# Patient Record
Sex: FEMALE | Race: WHITE | NOT HISPANIC OR LATINO | Employment: FULL TIME | URBAN - METROPOLITAN AREA
[De-identification: names, ages, dates, MRNs, and addresses within clinical notes are randomized per-mention and may not be internally consistent; named-entity substitution may affect disease eponyms.]

---

## 2021-07-02 ENCOUNTER — APPOINTMENT (OUTPATIENT)
Dept: RADIOLOGY | Facility: CLINIC | Age: 51
End: 2021-07-02
Payer: COMMERCIAL

## 2021-07-02 ENCOUNTER — OFFICE VISIT (OUTPATIENT)
Dept: URGENT CARE | Facility: CLINIC | Age: 51
End: 2021-07-02
Payer: COMMERCIAL

## 2021-07-02 VITALS
HEART RATE: 84 BPM | SYSTOLIC BLOOD PRESSURE: 139 MMHG | RESPIRATION RATE: 18 BRPM | DIASTOLIC BLOOD PRESSURE: 87 MMHG | OXYGEN SATURATION: 99 % | TEMPERATURE: 97.9 F

## 2021-07-02 DIAGNOSIS — S93.401A MILD ANKLE SPRAIN, RIGHT, INITIAL ENCOUNTER: ICD-10-CM

## 2021-07-02 DIAGNOSIS — M79.671 RIGHT FOOT PAIN: ICD-10-CM

## 2021-07-02 DIAGNOSIS — M25.571 ACUTE RIGHT ANKLE PAIN: Primary | ICD-10-CM

## 2021-07-02 DIAGNOSIS — M25.571 ACUTE RIGHT ANKLE PAIN: ICD-10-CM

## 2021-07-02 PROCEDURE — 73630 X-RAY EXAM OF FOOT: CPT

## 2021-07-02 PROCEDURE — 99203 OFFICE O/P NEW LOW 30 MIN: CPT | Performed by: PHYSICIAN ASSISTANT

## 2021-07-02 PROCEDURE — 73610 X-RAY EXAM OF ANKLE: CPT

## 2021-07-02 NOTE — PROGRESS NOTES
800 11           NAME: Beba Hutchison is a 45 y o  female  : 1970    MRN: [id number]  DATE: 2021  TIME: 1:52 PM    Assessment and Plan   Acute right ankle pain [M25 571]  1  Acute right ankle pain  XR ankle 3+ vw right   2  Right foot pain  XR foot 3+ vw right   3  Mild ankle sprain, right, initial encounter         Patient Instructions   Boot as provided for comfort  Rest and ice as able  OTC IBU Q 4-6 hours prn for pain  Follow up with PCP/ortho in next 3-5 days  Patient agreeable to plan  To present to the ER if symptoms worsen  Chief Complaint     Chief Complaint   Patient presents with    Ankle Pain     Right ankle and foot pain after falling last night  History of Present Illness   Beba Hutchison presents to the clinic c/o    NO hitting head or any LOC with fall  Ankle Pain   The incident occurred 6 to 12 hours ago  The incident occurred at home  The injury mechanism was a fall  The pain is present in the right ankle and right foot  The quality of the pain is described as aching  The pain is moderate  Associated symptoms include an inability to bear weight  Pertinent negatives include no muscle weakness or numbness  She reports no foreign bodies present  The symptoms are aggravated by movement, palpation and weight bearing  She has tried NSAIDs for the symptoms  The treatment provided mild relief  Review of Systems   Review of Systems   Constitutional: Negative for chills, diaphoresis, fatigue and fever  HENT: Negative for congestion, ear discharge, ear pain and facial swelling  Eyes: Negative for photophobia, pain, discharge, redness, itching and visual disturbance  Respiratory: Negative for apnea, cough, chest tightness, shortness of breath and wheezing  Cardiovascular: Negative for chest pain and palpitations  Gastrointestinal: Negative for abdominal pain  Musculoskeletal: Positive for arthralgias and gait problem     Skin: Negative for color change, rash and wound  Neurological: Negative for dizziness, numbness and headaches  Hematological: Negative for adenopathy  Current Medications     No long-term medications on file  Current Allergies     Allergies as of 07/02/2021    (No Known Allergies)            The following portions of the patient's history were reviewed and updated as appropriate: allergies, current medications, past family history, past medical history, past social history, past surgical history and problem list   No past medical history on file  No past surgical history on file  Social History     Socioeconomic History    Marital status: /Civil Union     Spouse name: Not on file    Number of children: Not on file    Years of education: Not on file    Highest education level: Not on file   Occupational History    Not on file   Tobacco Use    Smoking status: Not on file   Substance and Sexual Activity    Alcohol use: Not on file    Drug use: Not on file    Sexual activity: Not on file   Other Topics Concern    Not on file   Social History Narrative    Not on file     Social Determinants of Health     Financial Resource Strain:     Difficulty of Paying Living Expenses:    Food Insecurity:     Worried About Running Out of Food in the Last Year:     [address] in the Last Year:    Transportation Needs:     Lack of Transportation (Medical):      Lack of Transportation (Non-Medical):    Physical Activity:     Days of Exercise per Week:     Minutes of Exercise per Session:    Stress:     Feeling of Stress :    Social Connections:     Frequency of Communication with Friends and Family:     Frequency of Social Gatherings with Friends and Family:     Attends Yazidism Services:     Active Member of Clubs or Organizations:     Attends Club or Organization Meetings:     Marital Status:    Intimate Partner Violence:     Fear of Current or Ex-Partner:     Emotionally Abused:     Physically Abused:     Sexually Abused:        Objective   /87   Pulse 84   Temp 97 9 °F (36 6 °C)   Resp 18   SpO2 99%      Physical Exam     Physical Exam  Vitals and nursing note reviewed  Constitutional:       General: She is not in acute distress  Appearance: She is well-developed  She is not diaphoretic  HENT:      Head: Normocephalic and atraumatic  Right Ear: External ear normal       Left Ear: External ear normal       Nose: Nose normal    Eyes:      General: No scleral icterus  Right eye: No discharge  Left eye: No discharge  Conjunctiva/sclera: Conjunctivae normal    Cardiovascular:      Rate and Rhythm: Normal rate and regular rhythm  Heart sounds: Normal heart sounds  No murmur heard  No friction rub  No gallop  Pulmonary:      Effort: Pulmonary effort is normal  No respiratory distress  Breath sounds: Normal breath sounds  No decreased breath sounds, wheezing, rhonchi or rales  Musculoskeletal:      Right ankle: Swelling present  Tenderness present  Normal range of motion  Right foot: Decreased range of motion  Swelling, tenderness and bony tenderness present  Skin:     General: Skin is warm and dry  Coloration: Skin is not pale  Findings: No erythema or rash  Neurological:      Mental Status: She is alert and oriented to person, place, and time  Psychiatric:         Behavior: Behavior normal          Thought Content:  Thought content normal          Judgment: Judgment normal          Bharat Orellana PA-C

## 2024-08-18 ENCOUNTER — HOSPITAL ENCOUNTER (INPATIENT)
Facility: HOSPITAL | Age: 54
LOS: 1 days | Discharge: HOME | DRG: 183 | End: 2024-08-19
Attending: SURGERY | Admitting: SURGERY
Payer: COMMERCIAL

## 2024-08-18 ENCOUNTER — HOSPITAL ENCOUNTER (EMERGENCY)
Facility: HOSPITAL | Age: 54
End: 2024-08-18
Attending: EMERGENCY MEDICINE
Payer: COMMERCIAL

## 2024-08-18 ENCOUNTER — APPOINTMENT (EMERGENCY)
Dept: CT IMAGING | Facility: HOSPITAL | Age: 54
End: 2024-08-18
Payer: COMMERCIAL

## 2024-08-18 ENCOUNTER — APPOINTMENT (INPATIENT)
Dept: RADIOLOGY | Facility: HOSPITAL | Age: 54
DRG: 183 | End: 2024-08-18
Payer: COMMERCIAL

## 2024-08-18 VITALS
WEIGHT: 125 LBS | HEIGHT: 64 IN | RESPIRATION RATE: 16 BRPM | OXYGEN SATURATION: 97 % | HEART RATE: 79 BPM | DIASTOLIC BLOOD PRESSURE: 76 MMHG | SYSTOLIC BLOOD PRESSURE: 155 MMHG | TEMPERATURE: 98 F | BODY MASS INDEX: 21.34 KG/M2

## 2024-08-18 DIAGNOSIS — S22.49XA RIB FRACTURES: Primary | ICD-10-CM

## 2024-08-18 DIAGNOSIS — S27.329A PULMONARY CONTUSION: Primary | ICD-10-CM

## 2024-08-18 DIAGNOSIS — S22.49XA RIB FRACTURES: ICD-10-CM

## 2024-08-18 DIAGNOSIS — S22.41XA CLOSED FRACTURE OF MULTIPLE RIBS OF RIGHT SIDE, INITIAL ENCOUNTER: ICD-10-CM

## 2024-08-18 DIAGNOSIS — J94.2 HEMOTHORAX ON LEFT: ICD-10-CM

## 2024-08-18 LAB
ABO GROUP BLD: NORMAL
ANION GAP SERPL CALCULATED.3IONS-SCNC: 11 MMOL/L (ref 4–13)
APTT PPP: 24 SECONDS (ref 23–34)
BASOPHILS # BLD AUTO: 0.03 THOUSANDS/ÂΜL (ref 0–0.1)
BASOPHILS NFR BLD AUTO: 0 % (ref 0–1)
BLD GP AB SCN SERPL QL: NEGATIVE
BLD GP AB SCN SERPL QL: NEGATIVE
BUN SERPL-MCNC: 10 MG/DL (ref 5–25)
CALCIUM SERPL-MCNC: 9.3 MG/DL (ref 8.4–10.2)
CHLORIDE SERPL-SCNC: 100 MMOL/L (ref 96–108)
CO2 SERPL-SCNC: 22 MMOL/L (ref 21–32)
CREAT SERPL-MCNC: 0.69 MG/DL (ref 0.6–1.3)
EOSINOPHIL # BLD AUTO: 0.14 THOUSAND/ÂΜL (ref 0–0.61)
EOSINOPHIL NFR BLD AUTO: 2 % (ref 0–6)
ERYTHROCYTE [DISTWIDTH] IN BLOOD BY AUTOMATED COUNT: 12.1 % (ref 11.6–15.1)
ERYTHROCYTE [DISTWIDTH] IN BLOOD BY AUTOMATED COUNT: 12.1 % (ref 11.6–15.1)
GFR SERPL CREATININE-BSD FRML MDRD: 98 ML/MIN/1.73SQ M
GLUCOSE SERPL-MCNC: 104 MG/DL (ref 65–140)
HCT VFR BLD AUTO: 36.3 % (ref 34.8–46.1)
HCT VFR BLD AUTO: 37.9 % (ref 34.8–46.1)
HGB BLD-MCNC: 12 G/DL (ref 11.5–15.4)
HGB BLD-MCNC: 12.6 G/DL (ref 11.5–15.4)
IMM GRANULOCYTES # BLD AUTO: 0.06 THOUSAND/UL (ref 0–0.2)
IMM GRANULOCYTES NFR BLD AUTO: 1 % (ref 0–2)
INR PPP: 0.9 (ref 0.85–1.19)
LYMPHOCYTES # BLD AUTO: 1.14 THOUSANDS/ÂΜL (ref 0.6–4.47)
LYMPHOCYTES NFR BLD AUTO: 15 % (ref 14–44)
MCH RBC QN AUTO: 31.7 PG (ref 26.8–34.3)
MCH RBC QN AUTO: 32 PG (ref 26.8–34.3)
MCHC RBC AUTO-ENTMCNC: 33.1 G/DL (ref 31.4–37.4)
MCHC RBC AUTO-ENTMCNC: 33.2 G/DL (ref 31.4–37.4)
MCV RBC AUTO: 96 FL (ref 82–98)
MCV RBC AUTO: 96 FL (ref 82–98)
MONOCYTES # BLD AUTO: 0.57 THOUSAND/ÂΜL (ref 0.17–1.22)
MONOCYTES NFR BLD AUTO: 7 % (ref 4–12)
NEUTROPHILS # BLD AUTO: 5.88 THOUSANDS/ÂΜL (ref 1.85–7.62)
NEUTS SEG NFR BLD AUTO: 75 % (ref 43–75)
NRBC BLD AUTO-RTO: 0 /100 WBCS
PLATELET # BLD AUTO: 242 THOUSANDS/UL (ref 149–390)
PLATELET # BLD AUTO: 255 THOUSANDS/UL (ref 149–390)
PLATELET # BLD AUTO: 255 THOUSANDS/UL (ref 149–390)
PMV BLD AUTO: 9.5 FL (ref 8.9–12.7)
POTASSIUM SERPL-SCNC: 3.7 MMOL/L (ref 3.5–5.3)
PROTHROMBIN TIME: 12.4 SECONDS (ref 12.3–15)
RBC # BLD AUTO: 3.79 MILLION/UL (ref 3.81–5.12)
RBC # BLD AUTO: 3.94 MILLION/UL (ref 3.81–5.12)
RH BLD: POSITIVE
SODIUM SERPL-SCNC: 133 MMOL/L (ref 135–147)
SPECIMEN EXPIRATION DATE: NORMAL
SPECIMEN EXPIRATION DATE: NORMAL
WBC # BLD AUTO: 7.82 THOUSAND/UL (ref 4.31–10.16)
WBC # BLD AUTO: 7.88 THOUSAND/UL (ref 4.31–10.16)

## 2024-08-18 PROCEDURE — 85025 COMPLETE CBC W/AUTO DIFF WBC: CPT | Performed by: EMERGENCY MEDICINE

## 2024-08-18 PROCEDURE — 99284 EMERGENCY DEPT VISIT MOD MDM: CPT

## 2024-08-18 PROCEDURE — 85049 AUTOMATED PLATELET COUNT: CPT

## 2024-08-18 PROCEDURE — 86850 RBC ANTIBODY SCREEN: CPT | Performed by: EMERGENCY MEDICINE

## 2024-08-18 PROCEDURE — 99223 1ST HOSP IP/OBS HIGH 75: CPT | Performed by: STUDENT IN AN ORGANIZED HEALTH CARE EDUCATION/TRAINING PROGRAM

## 2024-08-18 PROCEDURE — 99223 1ST HOSP IP/OBS HIGH 75: CPT | Performed by: SURGERY

## 2024-08-18 PROCEDURE — 71260 CT THORAX DX C+: CPT

## 2024-08-18 PROCEDURE — 86900 BLOOD TYPING SEROLOGIC ABO: CPT | Performed by: EMERGENCY MEDICINE

## 2024-08-18 PROCEDURE — 99285 EMERGENCY DEPT VISIT HI MDM: CPT | Performed by: EMERGENCY MEDICINE

## 2024-08-18 PROCEDURE — 85730 THROMBOPLASTIN TIME PARTIAL: CPT

## 2024-08-18 PROCEDURE — 74177 CT ABD & PELVIS W/CONTRAST: CPT

## 2024-08-18 PROCEDURE — 86900 BLOOD TYPING SEROLOGIC ABO: CPT

## 2024-08-18 PROCEDURE — 86901 BLOOD TYPING SEROLOGIC RH(D): CPT

## 2024-08-18 PROCEDURE — 86850 RBC ANTIBODY SCREEN: CPT

## 2024-08-18 PROCEDURE — 85027 COMPLETE CBC AUTOMATED: CPT

## 2024-08-18 PROCEDURE — 71045 X-RAY EXAM CHEST 1 VIEW: CPT

## 2024-08-18 PROCEDURE — 80048 BASIC METABOLIC PNL TOTAL CA: CPT | Performed by: EMERGENCY MEDICINE

## 2024-08-18 PROCEDURE — 85610 PROTHROMBIN TIME: CPT

## 2024-08-18 PROCEDURE — 36415 COLL VENOUS BLD VENIPUNCTURE: CPT | Performed by: EMERGENCY MEDICINE

## 2024-08-18 PROCEDURE — 86901 BLOOD TYPING SEROLOGIC RH(D): CPT | Performed by: EMERGENCY MEDICINE

## 2024-08-18 RX ORDER — METHOCARBAMOL 750 MG/1
750 TABLET, FILM COATED ORAL EVERY 6 HOURS SCHEDULED
Status: DISCONTINUED | OUTPATIENT
Start: 2024-08-18 | End: 2024-08-19 | Stop reason: HOSPADM

## 2024-08-18 RX ORDER — ONDANSETRON 2 MG/ML
4 INJECTION INTRAMUSCULAR; INTRAVENOUS EVERY 4 HOURS PRN
Status: DISCONTINUED | OUTPATIENT
Start: 2024-08-18 | End: 2024-08-19 | Stop reason: HOSPADM

## 2024-08-18 RX ORDER — AMOXICILLIN 250 MG
1 CAPSULE ORAL
Status: DISCONTINUED | OUTPATIENT
Start: 2024-08-18 | End: 2024-08-19 | Stop reason: HOSPADM

## 2024-08-18 RX ORDER — FENTANYL CITRATE 50 UG/ML
1 INJECTION, SOLUTION INTRAMUSCULAR; INTRAVENOUS ONCE
Status: COMPLETED | OUTPATIENT
Start: 2024-08-18 | End: 2024-08-18

## 2024-08-18 RX ORDER — LIDOCAINE 50 MG/G
2 PATCH TOPICAL DAILY
Status: DISCONTINUED | OUTPATIENT
Start: 2024-08-18 | End: 2024-08-19 | Stop reason: HOSPADM

## 2024-08-18 RX ORDER — OXYCODONE HYDROCHLORIDE 5 MG/1
5 TABLET ORAL EVERY 4 HOURS PRN
Status: DISCONTINUED | OUTPATIENT
Start: 2024-08-18 | End: 2024-08-18

## 2024-08-18 RX ORDER — ENOXAPARIN SODIUM 100 MG/ML
30 INJECTION SUBCUTANEOUS EVERY 12 HOURS
Status: DISCONTINUED | OUTPATIENT
Start: 2024-08-18 | End: 2024-08-19 | Stop reason: HOSPADM

## 2024-08-18 RX ORDER — POLYETHYLENE GLYCOL 3350 17 G/17G
17 POWDER, FOR SOLUTION ORAL DAILY
Status: DISCONTINUED | OUTPATIENT
Start: 2024-08-18 | End: 2024-08-19 | Stop reason: HOSPADM

## 2024-08-18 RX ORDER — ACETAMINOPHEN 325 MG/1
975 TABLET ORAL EVERY 8 HOURS SCHEDULED
Status: DISCONTINUED | OUTPATIENT
Start: 2024-08-18 | End: 2024-08-19 | Stop reason: HOSPADM

## 2024-08-18 RX ORDER — OXYCODONE AND ACETAMINOPHEN 5; 325 MG/1; MG/1
1 TABLET ORAL ONCE
Status: DISCONTINUED | OUTPATIENT
Start: 2024-08-18 | End: 2024-08-18 | Stop reason: HOSPADM

## 2024-08-18 RX ORDER — KETOROLAC TROMETHAMINE 30 MG/ML
15 INJECTION, SOLUTION INTRAMUSCULAR; INTRAVENOUS EVERY 6 HOURS SCHEDULED
Status: DISCONTINUED | OUTPATIENT
Start: 2024-08-18 | End: 2024-08-19 | Stop reason: HOSPADM

## 2024-08-18 RX ORDER — LEVOTHYROXINE SODIUM 112 UG/1
112 TABLET ORAL
Status: DISCONTINUED | OUTPATIENT
Start: 2024-08-18 | End: 2024-08-19 | Stop reason: HOSPADM

## 2024-08-18 RX ORDER — OXYCODONE HYDROCHLORIDE 5 MG/1
5 TABLET ORAL EVERY 4 HOURS PRN
Status: DISCONTINUED | OUTPATIENT
Start: 2024-08-18 | End: 2024-08-19 | Stop reason: HOSPADM

## 2024-08-18 RX ORDER — ACETAMINOPHEN 325 MG/1
650 TABLET ORAL ONCE
Status: COMPLETED | OUTPATIENT
Start: 2024-08-18 | End: 2024-08-18

## 2024-08-18 RX ORDER — OXYCODONE HYDROCHLORIDE 10 MG/1
10 TABLET ORAL EVERY 4 HOURS PRN
Status: DISCONTINUED | OUTPATIENT
Start: 2024-08-18 | End: 2024-08-19 | Stop reason: HOSPADM

## 2024-08-18 RX ORDER — HYDROMORPHONE HCL IN WATER/PF 6 MG/30 ML
0.2 PATIENT CONTROLLED ANALGESIA SYRINGE INTRAVENOUS EVERY 2 HOUR PRN
Status: DISCONTINUED | OUTPATIENT
Start: 2024-08-18 | End: 2024-08-19

## 2024-08-18 RX ADMIN — METHOCARBAMOL 750 MG: 750 TABLET ORAL at 11:41

## 2024-08-18 RX ADMIN — ACETAMINOPHEN 975 MG: 325 TABLET ORAL at 13:58

## 2024-08-18 RX ADMIN — SENNOSIDES AND DOCUSATE SODIUM 1 TABLET: 50; 8.6 TABLET ORAL at 21:57

## 2024-08-18 RX ADMIN — Medication 2.5 MG: at 08:46

## 2024-08-18 RX ADMIN — ACETAMINOPHEN 975 MG: 325 TABLET ORAL at 21:57

## 2024-08-18 RX ADMIN — METHOCARBAMOL 750 MG: 750 TABLET ORAL at 17:43

## 2024-08-18 RX ADMIN — ENOXAPARIN SODIUM 30 MG: 30 INJECTION SUBCUTANEOUS at 05:59

## 2024-08-18 RX ADMIN — LEVOTHYROXINE SODIUM 112 MCG: 112 TABLET ORAL at 06:19

## 2024-08-18 RX ADMIN — ENOXAPARIN SODIUM 30 MG: 30 INJECTION SUBCUTANEOUS at 17:43

## 2024-08-18 RX ADMIN — LIDOCAINE 2 PATCH: 50 PATCH CUTANEOUS at 08:44

## 2024-08-18 RX ADMIN — METHOCARBAMOL 750 MG: 750 TABLET ORAL at 05:59

## 2024-08-18 RX ADMIN — KETOROLAC TROMETHAMINE 15 MG: 30 INJECTION, SOLUTION INTRAMUSCULAR; INTRAVENOUS at 11:41

## 2024-08-18 RX ADMIN — ACETAMINOPHEN 650 MG: 325 TABLET ORAL at 03:13

## 2024-08-18 RX ADMIN — POTASSIUM CHLORIDE 30 MEQ: 1500 TABLET, EXTENDED RELEASE ORAL at 21:57

## 2024-08-18 RX ADMIN — KETOROLAC TROMETHAMINE 15 MG: 30 INJECTION, SOLUTION INTRAMUSCULAR; INTRAVENOUS at 17:43

## 2024-08-18 RX ADMIN — IOHEXOL 100 ML: 350 INJECTION, SOLUTION INTRAVENOUS at 01:23

## 2024-08-18 NOTE — CASE MANAGEMENT
Case Management Assessment & Discharge Planning Note    Patient name Afua Menjivar  Location [facility] 626/[facility] 626-01 MRN [id number]  : 1970 Date 2024       Current Admission Date: 2024  Current Admission Diagnosis:Fracture of multiple ribs of right side  Patient Active Problem List    Diagnosis Date Noted Date Diagnosed    Fracture of multiple ribs of right side 2024       LOS (days): 0  Geometric Mean LOS (GMLOS) (days):   Days to GMLOS:     OBJECTIVE:    Risk of Unplanned Readmission Score: 5.32         Current admission status: Inpatient       Preferred Pharmacy:   [id number]  [place] - [address]  [address] [place] [address]  Phone: [phone] Fax: [phone]    [id number] - [place] - [address]  [address]  [place] [address]  Phone: [phone] Fax: [phone]    Primary Care Provider: Shaun Velasco    Primary Insurance: [employer]  Secondary Insurance: [employer]    ASSESSMENT:  Active Health Care Proxies       Tiara Lb Health Care Agent - Spouse   Primary Phone: [phone] (Mobile)                    Readmission Root Cause  30 Day Readmission: No    Patient Information  Admitted from:: Home  Mental Status: Alert  During Assessment patient was accompanied by: Not accompanied during assessment  Assessment information provided by:: Patient  Primary Caregiver: Self  Support Systems: Family members, Spouse/significant other  County of Residence: Other (specify in comment box[place] ([place]  What city do you live in?: [place]  Living Arrangements: Lives w/ Spouse/significant other  Is patient a ?: No    Activities of Daily Living Prior to Admission  Functional Status: Independent  Completes ADLs independently?: Yes  Ambulates independently?: Yes  Does patient use assisted devices?: No  Does patient currently own DME?: No  Does patient have a history of Outpatient  Therapy (PT/OT)?: No  Does the patient have a history of Short-Term Rehab?: No  Does patient have a history of HHC?: No  Does patient currently have HHC?: No         Patient Information Continued  Income Source: Employed  Does patient have prescription coverage?: Yes  Does patient receive dialysis treatments?: No  Does patient have a history of substance abuse?: No  Does patient have a history of Mental Health Diagnosis?: No         Means of Transportation  Means of Transport to Appts:: Family transport          DISCHARGE DETAILS:    Discharge planning discussed with:: Patient  Freedom of Choice: Yes  Comments - Freedom of Choice: FOC discussed.  Pt stated if therapy was recommended, she would like to decline.  She want to DC home     Requested Home Health Care         Is the patient interested in HHC at discharge?: No    DME Referral Provided  Referral made for DME?: No    Other Referral/Resources/Interventions Provided:  Referral Comments: PT/OT pending.  Pt stated she would decline rehab if recommended, HHC and OP included.  CM will continue to follow for any discharge needs.

## 2024-08-18 NOTE — ED PROVIDER NOTES
History  Chief Complaint   Patient presents with    Rib Injury     Pt reports she was exiting a hot tub then fell striking a plastic intake container on her left side. Pt reports left rib pain that increases with deep inhalation. Pt denies head strike and BT     Patient states that she was leaving a hot tub when she stood the top of it.  Because it was slippery she lost her footing and fell onto her left side which struck a nearby railing.  Had immediate moderate to severe pain.  Worse with palpation and movement.  Nothing makes it feel better.  Pain has been stable since the approximately half hour hour ago when this happened.  Is never anything like this happen before.  She is not on any blood thinners.  She is adamant she did not hit any other part of her body.  Reports that she is otherwise healthy.        Prior to Admission Medications   Prescriptions Last Dose Informant Patient Reported? Taking?   Levothyroxine Sodium (SYNTHROID PO)   Yes No      Facility-Administered Medications: None       History reviewed. No pertinent past medical history.    History reviewed. No pertinent surgical history.    History reviewed. No pertinent family history.  I have reviewed and agree with the history as documented.    E-Cigarette/Vaping     E-Cigarette/Vaping Substances          Review of Systems   Constitutional:  Negative for activity change, chills, fatigue and fever.   HENT:  Negative for congestion.    Eyes:  Negative for visual disturbance.   Respiratory:  Negative for cough, chest tightness and shortness of breath.    Cardiovascular:  Negative for chest pain.   Gastrointestinal:  Negative for abdominal pain, diarrhea and vomiting.   Genitourinary:  Negative for dysuria.   Skin:  Negative for rash.   Neurological:  Negative for dizziness, weakness and numbness.       Physical Exam  Physical Exam  Constitutional:       General: She is not in acute distress.     Appearance: She is well-developed. She is not  ill-appearing, toxic-appearing or diaphoretic.   HENT:      Head: Normocephalic and atraumatic.      Right Ear: External ear normal.      Left Ear: External ear normal.      Nose: Nose normal.      Mouth/Throat:      Mouth: Mucous membranes are moist.      Pharynx: Oropharynx is clear.   Eyes:      Conjunctiva/sclera: Conjunctivae normal.      Pupils: Pupils are equal, round, and reactive to light.   Cardiovascular:      Rate and Rhythm: Normal rate and regular rhythm.      Heart sounds: Normal heart sounds.   Pulmonary:      Effort: Pulmonary effort is normal. No respiratory distress.      Breath sounds: Normal breath sounds.   Abdominal:      General: Bowel sounds are normal. There is no distension.      Palpations: Abdomen is soft.      Tenderness: There is no abdominal tenderness. There is no guarding or rebound.   Musculoskeletal:         General: Tenderness (over left mid to lower ribs posteriorly) present. No swelling or deformity. Normal range of motion.      Cervical back: Normal range of motion and neck supple.   Skin:     General: Skin is warm and dry.      Capillary Refill: Capillary refill takes less than 2 seconds.   Neurological:      General: No focal deficit present.      Mental Status: She is alert and oriented to person, place, and time.   Psychiatric:         Mood and Affect: Mood normal.         Behavior: Behavior normal.         Thought Content: Thought content normal.         Judgment: Judgment normal.         Vital Signs  ED Triage Vitals   Temperature Pulse Respirations Blood Pressure SpO2   08/18/24 0016 08/18/24 0016 08/18/24 0016 08/18/24 0016 08/18/24 0016   (!) 97.4 °F (36.3 °C) 71 18 166/96 97 %      Temp Source Heart Rate Source Patient Position - Orthostatic VS BP Location FiO2 (%)   08/18/24 0016 08/18/24 0016 08/18/24 0100 08/18/24 0016 --   Tympanic Monitor Sitting Left arm       Pain Score       08/18/24 0016       7           Vitals:    08/18/24 0100 08/18/24 0130 08/18/24 0200  08/18/24 0300   BP: 158/74 165/74 160/79 155/76   Pulse: 71 75 81 79   Patient Position - Orthostatic VS: Sitting Sitting Sitting Sitting         Visual Acuity  Visual Acuity      Flowsheet Row Most Recent Value   L Pupil Size (mm) 3   R Pupil Size (mm) 3            ED Medications  Medications   iohexol (OMNIPAQUE) 350 MG/ML injection (MULTI-DOSE) 100 mL (100 mL Intravenous Given 8/18/24 0123)   acetaminophen (TYLENOL) tablet 650 mg (650 mg Oral Given 8/18/24 0313)       Diagnostic Studies  Results Reviewed       Procedure Component Value Units Date/Time    Basic metabolic panel [[id number]]  (Abnormal) Collected: 08/18/24 0049    Lab Status: Final result Specimen: Blood from Arm, Right Updated: 08/18/24 0110     Sodium 133 mmol/L      Potassium 3.7 mmol/L      Chloride 100 mmol/L      CO2 22 mmol/L      ANION GAP 11 mmol/L      BUN 10 mg/dL      Creatinine 0.69 mg/dL      Glucose 104 mg/dL      Calcium 9.3 mg/dL      eGFR 98 ml/min/1.73sq m     Narrative:      National Kidney Disease Foundation guidelines for Chronic Kidney Disease (CKD):     Stage 1 with normal or high GFR (GFR > 90 mL/min/1.73 square meters)    Stage 2 Mild CKD (GFR = 60-89 mL/min/1.73 square meters)    Stage 3A Moderate CKD (GFR = 45-59 mL/min/1.73 square meters)    Stage 3B Moderate CKD (GFR = 30-44 mL/min/1.73 square meters)    Stage 4 Severe CKD (GFR = 15-29 mL/min/1.73 square meters)    Stage 5 End Stage CKD (GFR <15 mL/min/1.73 square meters)  Note: GFR calculation is accurate only with a steady state creatinine    CBC and differential [[id number]] Collected: 08/18/24 0049    Lab Status: Final result Specimen: Blood from Arm, Right Updated: 08/18/24 0053     WBC 7.82 Thousand/uL      RBC 3.94 Million/uL      Hemoglobin 12.6 g/dL      Hematocrit 37.9 %      MCV 96 fL      MCH 32.0 pg      MCHC 33.2 g/dL      RDW 12.1 %      MPV 9.5 fL      Platelets 242 Thousands/uL      nRBC 0 /100 WBCs      Segmented % 75 %      Immature Grans % 1 %       Lymphocytes % 15 %      Monocytes % 7 %      Eosinophils Relative 2 %      Basophils Relative 0 %      Absolute Neutrophils 5.88 Thousands/µL      Absolute Immature Grans 0.06 Thousand/uL      Absolute Lymphocytes 1.14 Thousands/µL      Absolute Monocytes 0.57 Thousand/µL      Eosinophils Absolute 0.14 Thousand/µL      Basophils Absolute 0.03 Thousands/µL                    CT chest abdomen pelvis w contrast   Final Result by Juancarlos Mi DO (08/18 0218)      Left eighth through 10th rib fractures with small left hemothorax, containing few small locules of gas      Atelectasis versus pulmonary contusion at the left posterior lung base         I personally discussed this study with MYNOR SALAZAR on 8/18/2024 2:17 AM.               Workstation performed: [id number]                    Procedures  Procedures         ED Course                                 SBIRT 20yo+      Flowsheet Row Most Recent Value   Initial Alcohol Screen: US AUDIT-C     1. How often do you have a drink containing alcohol? 1 Filed at: 08/18/2024 0117   2. How many drinks containing alcohol do you have on a typical day you are drinking?  1 Filed at: 08/18/2024 0117   3a. Male UNDER 65: How often do you have five or more drinks on one occasion? 0 Filed at: 08/18/2024 0117   3b. FEMALE Any Age, or MALE 65+: How often do you have 4 or more drinks on one occassion? 1 Filed at: 08/18/2024 0117   Audit-C Score 3 Filed at: 08/18/2024 0117   GILBERT: How many times in the past year have you...    Used an illegal drug or used a prescription medication for non-medical reasons? Never Filed at: 08/18/2024 0117                      Medical Decision Making  54-year-old female with a mild mechanical fall resulting in multiple traumatic issues.  Initially patient did not meet trauma evaluation criteria.  However, due to patient's significant discomfort and some concern for rib fractures imaging was obtained.  This demonstrated hemothorax, pulmonary contusion and  multiple rib fractures.  Case discussed with trauma surgeon on-call.  They felt patient would be safer at [facility] under the trauma service.  Patient did not require chest tube here as hemothorax was extremely minimal and did not seem to be rapidly enlarging.  Patient vital signs were stable.  Patient stated understanding and agreement with the plan.  Patient repeatedly declined pain medication.    Problems Addressed:  Hemothorax on left: acute illness or injury  Pulmonary contusion: acute illness or injury  Rib fractures: acute illness or injury    Amount and/or Complexity of Data Reviewed  Independent Historian: EMS  Labs: ordered.  Radiology: ordered.  Discussion of management or test interpretation with external provider(s): Case discussed with trauma surgeon on-call who feels patient was brought to [facility] for evaluation    Risk  OTC drugs.  Prescription drug management.                 Disposition  Final diagnoses:   Pulmonary contusion   Rib fractures   Hemothorax on left     Time reflects when diagnosis was documented in both MDM as applicable and the Disposition within this note       Time User Action Codes Description Comment    8/18/2024  2:25 AM Shaun Del Valle [S27.329A] Pulmonary contusion     8/18/2024  2:25 AM Shaun Del Valle [S22.49XA] Rib fractures     8/18/2024  2:25 AM Shaun Del Valle [J94.2] Hemothorax on left           ED Disposition       ED Disposition   Transfer to Another Facility-In Network    Condition   --    Date/Time   Sun Aug 18, 2024 0225    Comment   Afua Menjivar should be transferred out to [facility]  .               MD Documentation      Flowsheet Row Most Recent Value   Accepting Physician Justin   Accepting Facility Name, City & State  B   Sending MD Ivon WOODWARD Documentation      Flowsheet Row Most Recent Value   Accepting Facility Name, City & State  B   [facility] [place] ED Trauma   Report Given to Ronni WOODWARD          Follow-up Information    None          Discharge Medication List as of 8/18/2024  4:40 AM        CONTINUE these medications which have NOT CHANGED    Details   Levothyroxine Sodium (SYNTHROID PO) Historical Med             No discharge procedures on file.    PDMP Review       None            ED Provider  Electronically Signed by             Shaun Del Valle MD  08/24/24 6002

## 2024-08-18 NOTE — ED NOTES
Patient requested to lay even further on her R side. Patient repositioned with pillows     Robson Jackson RN  08/18/24 1150

## 2024-08-18 NOTE — ASSESSMENT & PLAN NOTE
Right 8-10 rib fractures c/b small hemothorax    CT A/P (8/2024):   IMPRESSION:  Left eighth through 10th rib fractures with small left hemothorax, containing few small locules of gas  Atelectasis versus pulmonary contusion at the left posterior lung base    Overall, Afua is doing well. No indication for nerve block or epidural at this point in time. Given there is no active areas of extravasation, I will add toradol for analgesic support.    Plan:  Start IV toradol 15mg q6hr ortega x2 days  Will increase PO oxycodone to 5/10mg q4hr PRN for mod-severe pain  Continue IV dilaudid 0.2mg q2hr PRN for breakthrough  Continue other MMA   PO Tylenol 975mg q8hr ortega  Lyrica 50mg TID (home med)  Baclofen 10mg daily (home med)  Encourage OOB, PT/OT

## 2024-08-18 NOTE — H&P
H&P - Trauma   Afua Menjivar 54 y.o. female MRN: [id number]  Unit/Bed#: ED 15 Encounter: [id number]    Trauma Alert: Trauma Transfer  Model of Arrival: Ambulance    Trauma Team: Attending , Resident Dr. Paredes  Consultants: APS    Assessment & Plan   Active Problems / Assessment:   Mechanical fall  Right 8-10 rib fractures  Small hemothorax  Hypothyroid     Plan:   - Multiple right-sided rib fractures (8-10), present on admission.  - Continue rib fracture protocol.  - Continue to encourage incentive spirometer use and adequate pulmonary hygiene.  Currently pulling 1500 mL on I.S..  - Appreciate APS evaluation and recommendations.  - Continue multimodal analgesic regimen.  - Supplemental oxygen via nasal cannula as needed to maintain saturations greater than or equal to 94%.  - PT and OT evaluation and treatment as indicated.  - Outpatient follow-up in the trauma clinic for re-evaluation in approximately 2 weeks.      History of Present Illness     Chief Complaint: rib pain  Mechanism:Fall     HPI:    Afua Menjivar is a 54 y.o. female who presents right sided rib fractures after falling while getting out of bathtub. No head strike. Pulling 1500 IS    Review of Systems   Constitutional: Negative.    HENT: Negative.     Eyes: Negative.    Respiratory: Negative.     Cardiovascular: Negative.    Gastrointestinal: Negative.    Endocrine: Negative.    Genitourinary: Negative.    Musculoskeletal:  Positive for arthralgias.   Skin: Negative.    Allergic/Immunologic: Negative.    Neurological: Negative.    Hematological: Negative.    Psychiatric/Behavioral: Negative.       12-point, complete review of systems was reviewed and negative except as stated above.     Historical Information     No past medical history on file.  No past surgical history on file.   Unable to obtain history due to          There is no immunization history on file for this patient.  Last Tetanus:   Family History: Non-contributory      Meds/Allergies   all current active meds have been reviewed   No Known Allergies    Objective   Initial Vitals:   Temperature: 98.4 °F (36.9 °C) (08/18/24 0529)  Pulse: 74 (08/18/24 0529)  Respirations: 18 (08/18/24 0529)  Blood Pressure: 158/94 (08/18/24 0529)    Primary Survey:   Airway:        Status: patent;        Pre-hospital Interventions: none        Hospital Interventions: none  Breathing:        Pre-hospital Interventions: none       Effort: normal       Right breath sounds: normal       Left breath sounds: normal  Circulation:        Rhythm: regular no murmur       Rate: regular   Right Pulses Left Pulses        R pedal: 2+         L pedal: 2+       Disability:        GCS: Eye: 4; Verbal: 5 Motor: 6 Total: 15       Right Pupil: round;  reactive         Left Pupil:  round;  reactive      R Motor Strength L Motor Strength    R plantarflex: 5/5 L plantarflex: 5/5        Sensory:  No sensory deficit  Exposure:       Completed: No      Secondary Survey:  Physical Exam  Constitutional:       General: She is not in acute distress.     Appearance: She is not ill-appearing.   HENT:      Nose: No congestion or rhinorrhea.   Cardiovascular:      Rate and Rhythm: Normal rate and regular rhythm.      Heart sounds: No murmur heard.  Pulmonary:      Effort: No respiratory distress.      Breath sounds: No stridor. No wheezing.   Chest:      Chest wall: Tenderness present.   Abdominal:      General: There is no distension.      Tenderness: There is no abdominal tenderness.   Musculoskeletal:      Cervical back: No rigidity.   Skin:     Capillary Refill: Capillary refill takes less than 2 seconds.   Neurological:      General: No focal deficit present.      Mental Status: She is oriented to person, place, and time.      Cranial Nerves: No cranial nerve deficit.      Sensory: Sensory deficit present.      Motor: No weakness.   Psychiatric:         Mood and Affect: Mood normal.         Behavior: Behavior normal.          Invasive Devices       Peripheral Intravenous Line  Duration             Peripheral IV 08/18/24 Right Antecubital <1 day                  Lab Results: I have personally reviewed all pertinent laboratory/test results 08/18/24 and in the preceding 24 hours.  Recent Labs     08/18/24  0049   WBC 7.82   HGB 12.6   HCT 37.9      SODIUM 133*   K 3.7      CO2 22   BUN 10   CREATININE 0.69   GLUC 104       Imaging Results: I have personally reviewed pertinent images saved in PACS. CT scan findings (and other pertinent positive findings on images) were discussed with radiology. My interpretation of the images/reports are as follows:  Chest Xray(s): pending   FAST exam(s): N/A   CT Scan(s): positive for acute findings: As above   Additional Xray(s): N/A     Other Studies:     Code Status: No Order  Advance Directive and Living Will:      Power of :    POLST:

## 2024-08-18 NOTE — ED NOTES
Afua transfer to [facility] ER for trauma resident to Dr. Gerson dunlap accepting. [facility]  time 0430, phone to call report [phone], Fall getting out of hot tube, fx ribs, hemothorax, no chest tube, on R/A No thinners, NO LOC      Vickie Reyes RN  08/18/24 3038     [facility]        NAME: Vitor Brewster is a 21 y.o. female  : 1999    MRN: [id number]  DATE: October 10, 2023  TIME: 7:20 PM    Pulse (!) 125   Temp 98.7 °F (37.1 °C) (Oral)   Resp 18   Ht 5' 4" (1.626 m)   SpO2 97%   BMI 40.34 kg/m²     Assessment and Plan   Urinary tract infection without hematuria, site unspecified [N39.0]  1. Urinary tract infection without hematuria, site unspecified  POCT urine dip    Urine culture    cephalexin (KEFLEX) 500 mg capsule            Patient Instructions       Follow up with PCP in 3-5 days. Proceed to  ER if symptoms worsen. Chief Complaint     Chief Complaint   Patient presents with   • Fussy     Mother reports irritability with onset on Friday. States urinary frequency. Denies any known fever. Managing symptoms with Tylenol. Last dose 17:30 this evening. Pt is non verbal. Denies any changes in appetite. History of Present Illness       Pt with pulling ears  And being irritable, pt eating normal,  Has uti and otitis hx., parents state they think its one of them , she is acting like when she has had them in the past       Review of Systems   Review of Systems   Constitutional: Negative. HENT: Negative. Eyes: Negative. Respiratory: Negative. Cardiovascular: Negative. Gastrointestinal: Negative. Endocrine: Negative. Genitourinary: Negative. Musculoskeletal: Negative. Skin: Negative. Allergic/Immunologic: Negative. Neurological: Negative. Hematological: Negative. Psychiatric/Behavioral: Negative. All other systems reviewed and are negative.         Current Medications       Current Outpatient Medications:   •  cephalexin (KEFLEX) 500 mg capsule, Take 1 capsule (500 mg total) by mouth every 8 (eight) hours for 10 days, Disp: 30 capsule, Rfl: 0  •  risperiDONE (RisperDAL) 0.25 mg tablet, Take 0.25 mg by mouth 2 (two) times a day, Disp: , Rfl:   •  risperiDONE (RisperDAL) 0.5 mg tablet, Take 1 tablet (0.5 mg total) by mouth 2 (two) times a day, Disp: 90 tablet, Rfl: 1  •  risperiDONE (RisperDAL) 1 mg tablet, Take 1 tablet (1 mg total) by mouth 2 (two) times a day, Disp: 90 tablet, Rfl: 1  •  methylPREDNISolone 4 MG tablet therapy pack, Use as directed on package (Patient not taking: Reported on 10/17/2022), Disp: 21 each, Rfl: 0  •  propranolol (INDERAL) 20 mg tablet, Take 20 mg by mouth 2 (two) times a day (Patient not taking: Reported on 10/17/2022), Disp: , Rfl:     Current Allergies     Allergies as of 10/10/2023 - Reviewed 10/10/2023   Allergen Reaction Noted   • Sulfa antibiotics Other (See Comments) 06/14/2021            The following portions of the patient's history were reviewed and updated as appropriate: allergies, current medications, past family history, past medical history, past social history, past surgical history and problem list.     Past Medical History:   Diagnosis Date   • Autism    • Autism    • IBS (irritable colon syndrome)    • PCOS (polycystic ovarian syndrome)        Past Surgical History:   Procedure Laterality Date   • TYMPANOSTOMY TUBE PLACEMENT         Family History   Problem Relation Age of Onset   • Prostate cancer Maternal Grandfather          Medications have been verified. Objective   Pulse (!) 125   Temp 98.7 °F (37.1 °C) (Oral)   Resp 18   Ht 5' 4" (1.626 m)   SpO2 97%   BMI 40.34 kg/m²        Physical Exam     Physical Exam  Vitals and nursing note reviewed. Constitutional:       Appearance: Normal appearance. She is normal weight. HENT:      Head: Normocephalic and atraumatic. Right Ear: Tympanic membrane, ear canal and external ear normal.      Left Ear: Tympanic membrane, ear canal and external ear normal.      Nose: Nose normal.      Mouth/Throat:      Mouth: Mucous membranes are moist.      Pharynx: Oropharynx is clear. Eyes:      Extraocular Movements: Extraocular movements intact.       Conjunctiva/sclera: Conjunctivae normal.      Pupils: Pupils are equal, round, and reactive to light. Cardiovascular:      Rate and Rhythm: Normal rate and regular rhythm. Pulses: Normal pulses. Heart sounds: Normal heart sounds. Pulmonary:      Effort: Pulmonary effort is normal.      Breath sounds: Normal breath sounds. Abdominal:      General: Abdomen is flat. Bowel sounds are normal.      Palpations: Abdomen is soft. Musculoskeletal:         General: Normal range of motion. Cervical back: Normal range of motion and neck supple. Skin:     General: Skin is warm. Capillary Refill: Capillary refill takes less than 2 seconds. Neurological:      General: No focal deficit present. Mental Status: She is alert.

## 2024-08-18 NOTE — RESPIRATORY THERAPY NOTE
Airway Clearance Protocol     08/18/24 0604   Respiratory Protocol   Protocol Initiated? Yes   Protocol Selection Airway Clearance   Language Barrier? No   Medical & Social History Reviewed? Yes   Diagnostic Studies Reviewed? Yes   Physical Assessment Performed? Yes   Airway Clearance Plan Incentive Spirometer   Respiratory Assessment   Assessment Type Assess only   General Appearance Awake   Respiratory Pattern Shallow  (pain)   Chest Assessment Chest expansion symmetrical   Bilateral Breath Sounds Diminished   Resp Comments pt assessed per airway clearance protocol at this time, pt presented to b as a trauma following a fall, she has multiple rib fractures and shallow breathing due to pain. will continue to follow pt per rib fracture protocol and monitor via incentive spirometry.

## 2024-08-18 NOTE — EMTALA/ACUTE CARE TRANSFER
Formerly [facility], [facility] EMERGENCY DEPARTMENT  [address] [place] [address]  Dept: [phone]      EMTALA TRANSFER CONSENT    NAME Afua BRAR 1970                              MRN [id number]    I have been informed of my rights regarding examination, treatment, and transfer   by Dr. Shaun Del Valle MD    Benefits:      Risks:        Consent for Transfer:  I acknowledge that my medical condition has been evaluated and explained to me by the emergency department physician or other qualified medical person and/or my attending physician, who has recommended that I be transferred to the service of    at  . The above potential benefits of such transfer, the potential risks associated with such transfer, and the probable risks of not being transferred have been explained to me, and I fully understand them.  The doctor has explained that, in my case, the benefits of transfer outweigh the risks.  I agree to be transferred.    I authorize the performance of emergency medical procedures and treatments upon me in both transit and upon arrival at the receiving facility.  Additionally, I authorize the release of any and all medical records to the receiving facility and request they be transported with me, if possible.  I understand that the safest mode of transportation during a medical emergency is an ambulance and that the Hospital advocates the use of this mode of transport. Risks of traveling to the receiving facility by car, including absence of medical control, life sustaining equipment, such as oxygen, and medical personnel has been explained to me and I fully understand them.    (SHAHANA CORRECT BOX BELOW)  [  ]  I consent to the stated transfer and to be transported by ambulance/helicopter.  [  ]  I consent to the stated transfer, but refuse transportation by ambulance and accept full responsibility for my transportation by car.  I understand the risks of  non-ambulance transfers and I exonerate the Hospital and its staff from any deterioration in my condition that results from this refusal.    X___________________________________________    DATE  24  TIME________  Signature of patient or legally responsible individual signing on patient behalf           RELATIONSHIP TO PATIENT_________________________          Provider Certification    NAME Afua Menjivar                                         1970                              MRN [id number]    A medical screening exam was performed on the above named patient.  Based on the examination:    Condition Necessitating Transfer The primary encounter diagnosis was Pulmonary contusion. Diagnoses of Rib fractures and Hemothorax on left were also pertinent to this visit.    Patient Condition:      Reason for Transfer:      Transfer Requirements: Facility     Space available and qualified personnel available for treatment as acknowledged by    Agreed to accept transfer and to provide appropriate medical treatment as acknowledged by          Appropriate medical records of the examination and treatment of the patient are provided at the time of transfer   STAFF INITIAL WHEN COMPLETED _______  Transfer will be performed by qualified personnel from    and appropriate transfer equipment as required, including the use of necessary and appropriate life support measures.    Provider Certification: I have examined the patient and explained the following risks and benefits of being transferred/refusing transfer to the patient/family:         Based on these reasonable risks and benefits to the patient and/or the unborn child(albertina), and based upon the information available at the time of the patient’s examination, I certify that the medical benefits reasonably to be expected from the provision of appropriate medical treatments at another medical facility outweigh the increasing risks, if any, to the individual’s medical  condition, and in the case of labor to the unborn child, from effecting the transfer.    X____________________________________________ DATE 08/18/24        TIME_______      ORIGINAL - SEND TO MEDICAL RECORDS   COPY - SEND WITH PATIENT DURING TRANSFER

## 2024-08-18 NOTE — CONSULTS
Consultation - Acute Pain Service  Afua Menjivar 54 y.o. female MRN: [id number]  Unit/Bed#: [facility] 626-01 Encounter: [id number]               Afua Menjivar is a 54 y.o. female with no particular PMHx who presented with R 8-10 rib fractures after mechanical fall while trying getting out of the hot tub. APS consulted for post-traumatic pain control.    Upon bedside evaluation, Afua was resting in the chair without acute distress. Pain controlled on current MMA. Able to inspire 1.25L on spirometry and able to ambulate. Denies opioid-induced side effects including nausea/vomiting/itching/constipation.     Fracture of multiple ribs of right side  Assessment & Plan  Right 8-10 rib fractures c/b small hemothorax    CT A/P (8/2024):   IMPRESSION:  Left eighth through 10th rib fractures with small left hemothorax, containing few small locules of gas  Atelectasis versus pulmonary contusion at the left posterior lung base    Overall, Afua is doing well. No indication for nerve block or epidural at this point in time. Given there is no active areas of extravasation, I will add toradol for analgesic support.    Plan:  Start IV toradol 15mg q6hr ortega x2 days  Will increase PO oxycodone to 5/10mg q4hr PRN for mod-severe pain  Continue IV dilaudid 0.2mg q2hr PRN for breakthrough  Continue other MMA   PO Tylenol 975mg q8hr ortega  Lyrica 50mg TID (home med)  Baclofen 10mg daily (home med)  Encourage OOB, PT/OT        APS will sign off at this time. Thank you for the consult. All opioids and other analgesics to be written at discretion of primary team. Please contact Acute Pain Service - via [employer] from [phone] with additional questions or concerns. See [employer] or [employer] for additional contacts and after hours information.    History of Present Illness    Admit Date:  8/18/2024  Hospital Day:  0 days  Primary Service:  Trauma  Attending Provider:  Gerson Anderson,*  Physician Requesting Consult: Gerson Anderson,*  Reason  "for Consult / Principal Problem: Post-traumatic pain control  HPI: Afua Menjivar is a 54 y.o. year old female who presents with right 9-10 rib fractures after mechanical fall while getting out of hot tub.     Current pain location(s): Pain Score: 5  Pain Location/Orientation: Orientation: Left, Other (Comment) (rib cage)  Pain Scale: Pain Assessment Tool: 0-10  Current Analgesic regimen:  See above    Pain History: N/A  Pain Management Physician:  N/A    I have reviewed the patient's controlled substance dispensing history in the Prescription Drug Monitoring Program in compliance with the [facility] regulations before prescribing any controlled substances.     Inpatient consult to Acute Pain Service  Consult performed by: Ck Williamson MD  Consult ordered by: Josi Paredes DO          Review of Systems   Constitutional: Negative.    HENT: Negative.     Respiratory: Negative.     Cardiovascular: Negative.    Gastrointestinal: Negative.    Genitourinary: Negative.    Musculoskeletal: Negative.    Skin: Negative.    Neurological: Negative.    Psychiatric/Behavioral: Negative.         Historical Information   No past medical history on file.  No past surgical history on file.  Social History   Social History     Substance and Sexual Activity   Alcohol Use Yes     Social History     Substance and Sexual Activity   Drug Use Not on file     Social History     Tobacco Use   Smoking Status Never   Smokeless Tobacco Never     Family History: non-contributory    Meds/Allergies   all current active meds have been reviewed    No Known Allergies    Objective   Vitals:    08/18/24 0600 08/18/24 0800 08/18/24 1055 08/18/24 1100   BP: 149/83 140/85 135/87    BP Location:  Left arm     Pulse: 66 60 67    Resp: 18 18 16    Temp:   98 °F (36.7 °C)    TempSrc:       SpO2: 98% 97% 95%    Weight:    72.6 kg (160 lb)   Height:    5' 9\" (1.753 m)         Intake/Output Summary (Last 24 hours) at 8/18/2024 1241  Last data filed at " 8/18/2024 1239  Gross per 24 hour   Intake 222 ml   Output --   Net 222 ml       Physical Exam  Constitutional:       Appearance: Normal appearance.   HENT:      Head: Atraumatic.      Mouth/Throat:      Mouth: Mucous membranes are moist.   Eyes:      Pupils: Pupils are equal, round, and reactive to light.   Cardiovascular:      Rate and Rhythm: Regular rhythm.      Pulses: Normal pulses.   Pulmonary:      Effort: Pulmonary effort is normal.   Chest:      Chest wall: Tenderness (Right) present.   Abdominal:      Palpations: Abdomen is soft.   Musculoskeletal:         General: Normal range of motion.   Skin:     General: Skin is dry.   Neurological:      General: No focal deficit present.      Mental Status: She is alert and oriented to person, place, and time.   Psychiatric:         Mood and Affect: Mood normal.         Lab Results:  Estimated Creatinine Clearance: 96.3 mL/min (by C-G formula based on SCr of 0.69 mg/dL).  Lab Results   Component Value Date    WBC 7.88 08/18/2024    HGB 12.0 08/18/2024    HCT 36.3 08/18/2024     08/18/2024     08/18/2024         Component Value Date/Time    K 3.7 08/18/2024 0049    K 4.4 08/30/2018 0822     08/18/2024 0049     (H) 08/30/2018 0822    CO2 22 08/18/2024 0049    CO2 25 08/30/2018 0822    BUN 10 08/18/2024 0049    BUN 15 08/30/2018 0822    CREATININE 0.69 08/18/2024 0049    CREATININE 0.8 08/30/2018 0822         Component Value Date/Time    CALCIUM 9.3 08/18/2024 0049    CALCIUM 9.4 08/30/2018 0822    ALKPHOS 75 08/30/2018 0822    AST 20 08/30/2018 0822    ALT 13 08/30/2018 0822    TP 7.3 08/30/2018 0822    ALB 4.1 08/30/2018 0822       Imaging Studies/EKG: I have personally reviewed pertinent reports.      Counseling / Coordination of Care  Total floor / unit time spent today 20 minutes. Greater than 50% of total time was spent with the patient and / or family counseling and / or coordination of care.     Please note that the APS provides  consultative services regarding pain management only.  With the exception of ketamine and epidural infusions and except when indicated, final decisions regarding starting or changing doses of analgesic medications are at the discretion of the consulting service.  Ck Williamson MD  Acute Pain Service

## 2024-08-19 VITALS
OXYGEN SATURATION: 97 % | HEIGHT: 69 IN | SYSTOLIC BLOOD PRESSURE: 124 MMHG | RESPIRATION RATE: 16 BRPM | HEART RATE: 59 BPM | TEMPERATURE: 97.5 F | BODY MASS INDEX: 23.7 KG/M2 | DIASTOLIC BLOOD PRESSURE: 85 MMHG | WEIGHT: 160 LBS
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
## 2024-08-19 PROBLEM — E03.9 HYPOTHYROIDISM: Status: ACTIVE | Noted: 2024-08-19

## 2024-08-19 PROBLEM — S22.42XA FRACTURE OF MULTIPLE RIBS OF LEFT SIDE: Status: ACTIVE | Noted: 2024-08-18

## 2024-08-19 PROCEDURE — 97162 PT EVAL MOD COMPLEX 30 MIN: CPT

## 2024-08-19 PROCEDURE — 94664 DEMO&/EVAL PT USE INHALER: CPT

## 2024-08-19 PROCEDURE — 97166 OT EVAL MOD COMPLEX 45 MIN: CPT

## 2024-08-19 PROCEDURE — 99238 HOSP IP/OBS DSCHRG MGMT 30/<: CPT

## 2024-08-19 RX ORDER — OXYCODONE HYDROCHLORIDE 5 MG/1
5 TABLET ORAL EVERY 6 HOURS PRN
Qty: 25 TABLET | Refills: 0 | Status: SHIPPED | OUTPATIENT
Start: 2024-08-19 | End: 2024-08-29

## 2024-08-19 RX ORDER — ACETAMINOPHEN 325 MG/1
975 TABLET ORAL EVERY 8 HOURS SCHEDULED
Start: 2024-08-19

## 2024-08-19 RX ORDER — ONDANSETRON 4 MG/1
4 TABLET, FILM COATED ORAL EVERY 8 HOURS PRN
Qty: 20 TABLET | Refills: 0 | Status: SHIPPED | OUTPATIENT
Start: 2024-08-19

## 2024-08-19 RX ORDER — METHOCARBAMOL 750 MG/1
750 TABLET, FILM COATED ORAL EVERY 6 HOURS SCHEDULED
Qty: 56 TABLET | Refills: 0 | Status: SHIPPED | OUTPATIENT
Start: 2024-08-19 | End: 2024-09-02

## 2024-08-19 RX ORDER — AMOXICILLIN 250 MG
1 CAPSULE ORAL
Qty: 14 TABLET | Refills: 0 | Status: SHIPPED | OUTPATIENT
Start: 2024-08-19 | End: 2024-09-02

## 2024-08-19 RX ADMIN — ENOXAPARIN SODIUM 30 MG: 30 INJECTION SUBCUTANEOUS at 06:21

## 2024-08-19 RX ADMIN — METHOCARBAMOL 750 MG: 750 TABLET ORAL at 12:34

## 2024-08-19 RX ADMIN — METHOCARBAMOL 750 MG: 750 TABLET ORAL at 06:21

## 2024-08-19 RX ADMIN — KETOROLAC TROMETHAMINE 15 MG: 30 INJECTION, SOLUTION INTRAMUSCULAR; INTRAVENOUS at 12:34

## 2024-08-19 RX ADMIN — ACETAMINOPHEN 975 MG: 325 TABLET ORAL at 06:33

## 2024-08-19 RX ADMIN — POLYETHYLENE GLYCOL 3350 17 G: 17 POWDER, FOR SOLUTION ORAL at 08:14

## 2024-08-19 RX ADMIN — LEVOTHYROXINE SODIUM 112 MCG: 112 TABLET ORAL at 06:21

## 2024-08-19 RX ADMIN — ACETAMINOPHEN 975 MG: 325 TABLET ORAL at 13:28

## 2024-08-19 RX ADMIN — KETOROLAC TROMETHAMINE 15 MG: 30 INJECTION, SOLUTION INTRAMUSCULAR; INTRAVENOUS at 06:21

## 2024-08-19 RX ADMIN — LIDOCAINE 2 PATCH: 50 PATCH CUTANEOUS at 08:14

## 2024-08-19 RX ADMIN — OXYCODONE HYDROCHLORIDE 10 MG: 10 TABLET ORAL at 08:20

## 2024-08-19 RX ADMIN — METHOCARBAMOL 750 MG: 750 TABLET ORAL at 00:27

## 2024-08-19 RX ADMIN — KETOROLAC TROMETHAMINE 15 MG: 30 INJECTION, SOLUTION INTRAMUSCULAR; INTRAVENOUS at 00:27

## 2024-08-19 NOTE — DISCHARGE SUMMARY
[facility]  Discharge- Afua Menjivar 1970, 54 y.o. female MRN: [id number]  [facility] En[facility]: [id number]  Primary Care Provider: Shaun Velasco   Date and time admitted to hospital: 8/18/2024  5:21 AM    Hypothyroidism  Assessment & Plan  - Continue current medication regimen.  - Outpatient follow-up with PCP.      Fall  Assessment & Plan  - Status post fall with the below noted injuries.  - Fall precautions.  - PT and OT evaluation and treatment as indicated.  - Case Management consultation for disposition planning.      * Fracture of multiple ribs of left side  Assessment & Plan  - Multiple left-sided rib fractures (8th-10th) with associated small hemothorax, present on admission.  - Continue rib fracture protocol.  - Continue to encourage incentive spirometer use and adequate pulmonary hygiene.  Currently pulling 2250 mL on I.S.  - Monitor PIC score.  - Appreciate APS evaluation and recommendations.  - Continue multimodal analgesic regimen.  - Supplemental oxygen via nasal cannula as needed to maintain saturations greater than or equal to 94%.  - Repeat chest x-ray 8/18: No interval development of left pneumothorax or pleural fluid. Known rib fractures.   - PT and OT evaluation and treatment as indicated.  - Outpatient follow-up in the trauma clinic for re-evaluation in approximately 2 weeks.          Bowel Regimen: MiraLAX, Senokot-S  VTE Prophylaxis:Enoxaparin (Lovenox)     Disposition: Medically stable for discharge home    Subjective   Chief Complaint: Rib pain    Subjective: Patient reports she is doing well this morning.  She does endorse some mild dizziness and nausea after taking the pain medications.  She reports symptoms had resolved with time and eating lunch.  She otherwise denies complaints at this time.  She is motivated for discharge home.     Objective   Vitals:   Temp:  [97.2 °F (36.2 °C)-97.8 °F (36.6 °C)] 97.5 °F (36.4 °C)  HR:   [47-59] 59  Resp:  [16-20] 16  BP: (124-154)/(83-93) 124/85    I/O         08/17 0701  08/18 0700 08/18 0701 08/19 0700 08/19 0701  08/20 0700    P.O.  443     Total Intake(mL/kg)  443 (6.1)     Net  +443                     Physical Exam:   GENERAL APPEARANCE: Patient in no acute distress.  HEENT: NCAT; PERRL, EOMs intact; Mucous membranes moist  NECK / BACK: ROM normal  CV: Regular rate and rhythm; no murmur/gallops/rubs appreciated.  CHEST / LUNGS: Clear to auscultation; no wheezes/rales/rhonci.  ABD: NABS; soft; non-distended; non-tender.  : Voiding  EXT: +2 pulses bilaterally upper & lower extremities; no edema.  NEURO: GCS 15; no focal neurologic deficits; neurovascularly intact.  SKIN: Warm, dry and well perfused; no rash; no jaundice.      Invasive Devices       Peripheral Intravenous Line  Duration             Peripheral IV 08/18/24 Right Antecubital 1 day                     PIC Score  PIC Pain Score: 3 (8/19/2024 12:34 PM)  PIC Incentive Spirometry Score: 3 (8/19/2024  8:20 AM)  PIC Cough Description: 2 (8/19/2024  8:20 AM)  PIC Total Score: 7 (8/19/2024  8:20 AM)       If the Total PIC Score </=5, did you consult APS and evaluate patient for further intervention?: yes      Pain:    Incentive Spirometry  Cough  3 = Controlled  4 = Above goal volume 3 = Strong  2 = Moderate  3 = Goal to alert volume 2 = Weak  1 = Severe  2 = Below alert volume 1 = Absent     1 = Unable to perform IS         Lab Results: Results: I have personally reviewed all pertinent laboratory/tests results  Imaging: I have personally reviewed pertinent reports.     Other Studies: None         Medical Problems       Resolved Problems  Date Reviewed: 8/19/2024   None         Admission Date:   Admission Orders (From admission, onward)       Ordered        08/18/24 0547  Inpatient Admission  Once                            Admitting Diagnosis: Rib fractures [S22.49XA]  Unspecified multiple injuries, initial encounter [T07.XXXA]    HPI:  "Documented by Josi Paredes DO on 8/18/2024, \"Afua Menjivar is a 54 y.o. female who presents right sided rib fractures after falling while getting out of bathtub. No head strike. Pulling 1500 IS\"    Procedures Performed: No orders of the defined types were placed in this encounter.      Summary of Hospital Course: Patient was seen and evaluated by the trauma team and admitted with findings of multiple left-sided rib fractures.  She was admitted to the trauma service and placed on rib fracture protocol.  The acute pain service was consulted to aid in management of patient's pain.  At the time of discharge, patient's pain was well-controlled with use of oral pain regimen.  PT/OT evaluated patient recommended discharge home.  Patient was stable for discharge on 8/19/2024.  She will follow-up outpatient with her PCP in the trauma clinic as needed.  For further details on hospitalization, please reference hospital medical chart.    Significant Findings, Care, Treatment and Services Provided: Multiple left-sided rib fractures    Complications: None    Condition at Discharge: good         Discharge instructions/Information to patient and family:   See after visit summary for information provided to patient and family.      Provisions for Follow-Up Care:  See after visit summary for information related to follow-up care and any pertinent home health orders.      PCP: Shaun Velasco    Disposition: Home    Planned Readmission: No    Discharge Statement   I spent 26 minutes discharging the patient. This time was spent on the day of discharge. I had direct contact with the patient on the day of discharge. Additional documentation is required if more than 30 minutes were spent on discharge.     Discharge Medications:  See after visit summary for reconciled discharge medications provided to patient and family.                   "

## 2024-08-19 NOTE — ASSESSMENT & PLAN NOTE
- Continue current medication regimen.  - Outpatient follow-up with PCP.     [Procedure: _________] : a [unfilled] procedure visit [FreeTextEntry1] : Pt is here for EMG/NCV study of RT UE

## 2024-08-19 NOTE — UTILIZATION REVIEW
Initial Clinical Review    Admission: Date/Time/Statement:   Admission Orders (From admission, onward)       Ordered        08/18/24 0547  Inpatient Admission  Once                          Orders Placed This Encounter   Procedures    Inpatient Admission     Standing Status:   Standing     Number of Occurrences:   1     Order Specific Question:   Level of Care     Answer:   Med Surg [16]     Order Specific Question:   Estimated length of stay     Answer:   More than 2 Midnights     Order Specific Question:   Certification     Answer:   I certify that inpatient services are medically necessary for this patient for a duration of greater than two midnights. See H&P and MD Progress Notes for additional information about the patient's course of treatment.     ED Arrival Information       Expected   8/18/2024     Arrival   8/18/2024 05:21    Acuity   Urgent              Means of arrival   Ambulance    Escorted by   [facility]    Service   Trauma    Admission type   Emergency              Arrival complaint   -             Chief Complaint   Patient presents with    Trauma     [facility] ED trauma XF, L side 8-10 rib fx, small hemothorax       Initial Presentation: 54 y.o. female transferred [facility] for multiple rib fractures on right after fall while getting out of bathtub requiring trauma eval. No head strike during fall, hit right side of ribs.  Found to have Multiple right-sided rib fractures (8-10).  Encourage incentive spirometry.  Currently pulling 1500.  Rib fracture protocol.   Acute pain consult:  Start IV toradol, increase po oxycodone. COnitnue iV dilaudid as needed. Encourage OOB. Tender to right chest wall. NO indication for nerve block or epidural at this time.       Date: 8/19    Day 2:  Continue deep breathing, pain meds as needed. Oxygen as needed. Pain controlled with oral agents.     ED Triage Vitals [08/18/24 0529]   Temperature Pulse Respirations Blood Pressure  SpO2 Pain Score   98.4 °F (36.9 °C) 74 18 158/94 98 % 6     Weight (last 2 days)       Date/Time Weight    08/18/24 1100 72.6 (160)            Vital Signs (last 3 days)       Date/Time Temp Pulse Resp BP MAP (mmHg) SpO2 O2 Device Patient Position - Orthostatic VS [place] Coma Scale Score Pain    08/19/24 1234 -- -- -- -- -- -- -- -- -- 4    08/19/24 11:03:11 97.5 °F (36.4 °C) 59 16 124/85 98 97 % -- -- -- --    08/19/24 09:05:19 -- 58 16 138/88 105 94 % -- -- -- --    08/19/24 0820 -- -- -- -- -- -- -- -- -- 7    08/19/24 07:12:46 97.5 °F (36.4 °C) 54 16 142/88 106 98 % -- -- -- --    08/19/24 0633 -- -- -- -- -- -- -- -- -- 5 08/19/24 0400 -- -- -- -- -- -- -- -- 15 --    08/19/24 02:24:08 97.8 °F (36.6 °C) 47 20 146/89 108 99 % -- -- -- --    08/19/24 0027 -- -- -- -- -- -- -- -- -- 5 08/19/24 0000 -- -- -- -- -- -- None (Room air) -- 15 --    08/18/24 22:18:54 97.2 °F (36.2 °C) 51 18 154/93 113 99 % -- -- -- --    08/18/24 2157 -- -- -- -- -- -- -- -- -- 6 08/18/24 2027 -- -- -- -- -- -- None (Room air) -- 15 5 08/18/24 1900 -- -- -- -- -- -- -- -- 15 --    08/18/24 1849 -- -- -- -- -- -- -- -- -- 5 08/18/24 18:26:16 97.5 °F (36.4 °C) 58 18 128/83 98 97 % -- -- -- --    08/18/24 1743 -- -- -- -- -- -- -- -- -- 6 08/18/24 1520 -- -- -- -- -- -- -- -- -- 6 08/18/24 15:17:01 97.6 °F (36.4 °C) 55 20 130/86 101 98 % -- -- -- --    08/18/24 1500 -- -- -- -- -- -- -- -- 15 --    08/18/24 1358 -- -- -- -- -- -- -- -- -- 4 08/18/24 1141 -- -- -- -- -- -- -- -- -- 5 08/18/24 1104 -- -- -- -- -- -- None (Room air) -- 15 5 08/18/24 1057 -- -- -- -- -- -- -- -- -- 5 08/18/24 10:55:11 98 °F (36.7 °C) 67 16 135/87 103 95 % -- -- -- --    08/18/24 0930 -- -- -- -- -- -- -- -- 15 --    08/18/24 0846 -- -- -- -- -- -- -- -- -- 5 08/18/24 0800 -- 60 18 140/85 108 97 % None (Room air) Sitting -- 8 08/18/24 0600 -- 66 18 149/83 104 98 % None (Room air) -- -- 5 08/18/24 0542 -- -- -- -- -- --  -- -- 15 --    08/18/24 0529 98.4 °F (36.9 °C) 74 18 158/94 -- 98 % None (Room air) Sitting -- 6              Pertinent Labs/Diagnostic Test Results:   Radiology:  XR chest portable   Final Interpretation by Jing Acuna MD (08/18 1957)      No interval development of left pneumothorax or pleural fluid. Known rib fractures.            Workstation performed: [id number]           Results from last 7 days   Lab Units 08/18/24  0603 08/18/24  0049   WBC Thousand/uL 7.88 7.82   HEMOGLOBIN g/dL 12.0 12.6   HEMATOCRIT % 36.3 37.9   PLATELETS Thousands/uL 255  255 242   TOTAL NEUT ABS Thousands/µL  --  5.88         Results from last 7 days   Lab Units 08/18/24  0049   SODIUM mmol/L 133*   POTASSIUM mmol/L 3.7   CHLORIDE mmol/L 100   CO2 mmol/L 22   ANION GAP mmol/L 11   BUN mg/dL 10   CREATININE mg/dL 0.69   EGFR ml/min/1.73sq m 98   CALCIUM mg/dL 9.3             Results from last 7 days   Lab Units 08/18/24  0049   GLUCOSE RANDOM mg/dL 104         Results from last 7 days   Lab Units 08/18/24  0603   PROTIME seconds 12.4   INR  0.90   PTT seconds 24       ED Treatment-Medication Administration from 08/18/2024 0232 to 08/18/2024 1034         Date/Time Order Dose Route Action     08/18/2024 0528 fentanyl citrate (PF) (FOR EMS ONLY) 100 mcg/2 mL injection 100 mcg 0 mcg Does not apply Given to EMS     08/18/2024 0559 enoxaparin (LOVENOX) subcutaneous injection 30 mg 30 mg Subcutaneous Given     08/18/2024 0846 oxyCODONE (ROXICODONE) split tablet 2.5 mg 2.5 mg Oral Given     08/18/2024 0846 oxyCODONE (ROXICODONE) IR tablet 5 mg -- Oral See Alternative     08/18/2024 0559 methocarbamol (ROBAXIN) tablet 750 mg 750 mg Oral Given     08/18/2024 0844 lidocaine (LIDODERM) 5 % patch 2 patch 2 patch Topical Medication Applied     08/18/2024 0619 levothyroxine tablet 112 mcg 112 mcg Oral Given            Admitting Diagnosis: Rib fractures [S22.49XA]  Unspecified multiple injuries, initial encounter  [T07.XXXA]  Age/Sex: 54 y.o. female  Admission Orders:  Scheduled Medications:  acetaminophen, 975 mg, Oral, Q8H JARROD  enoxaparin, 30 mg, Subcutaneous, Q12H  ketorolac, 15 mg, Intravenous, Q6H JARROD  levothyroxine, 112 mcg, Oral, Early Morning  lidocaine, 2 patch, Topical, Daily  methocarbamol, 750 mg, Oral, Q6H JARROD  polyethylene glycol, 17 g, Oral, Daily  senna-docusate sodium, 1 tablet, Oral, HS      Continuous IV Infusions:     PRN Meds:  naloxone, 0.04 mg, Intravenous, Q1MIN PRN  ondansetron, 4 mg, Intravenous, Q4H PRN  oxyCODONE, 5 mg, Oral, Q4H PRN   Or  oxyCODONE, 10 mg, Oral, Q4H PRN        IP CONSULT TO ACUTE PAIN SERVICE    Network Utilization Review Department  ATTENTION: Please call with any questions or concerns to [phone] and carefully listen to the prompts so that you are directed to the right person. All voicemails are confidential.   For Discharge needs, contact Care Management DC Support Team at [phone] opt. [phone]  Send all requests for admission clinical reviews, approved or denied determinations and any other requests to dedicated fax number below belonging to the campus where the patient is receiving treatment. List of dedicated fax numbers for the Facilities:  FACILITY NAME UR FAX NUMBER   ADMISSION DENIALS (Administrative/Medical Necessity) [phone]   DISCHARGE SUPPORT TEAM (NETWORK) [phone]   PARENT CHILD HEALTH (Maternity/NICU/Pediatrics) [phone]   [facility] [phone]   [facility] [phone]   [facility] [phone]   [facility] [phone]   [facility] [phone]   [facility] [phone]   [facility] [phone]   [facility] [phone]   [facility] [phone]   [facility]  [phone]   [facility] [phone]   [facility] [phone]

## 2024-08-19 NOTE — PLAN OF CARE
Problem: PAIN - ADULT  Goal: Verbalizes/displays adequate comfort level or baseline comfort level  Description: Interventions:  - Encourage patient to monitor pain and request assistance  - Assess pain using appropriate pain scale  - Administer analgesics based on type and severity of pain and evaluate response  - Implement non-pharmacological measures as appropriate and evaluate response  - Consider cultural and social influences on pain and pain management  - Notify physician/advanced practitioner if interventions unsuccessful or patient reports new pain  8/18/2024 2027 by Raina Vargas RN  Outcome: Progressing  8/18/2024 1103 by Raina Vargas RN  Outcome: Progressing

## 2024-08-19 NOTE — ASSESSMENT & PLAN NOTE
- Multiple left-sided rib fractures (8th-10th) with associated small hemothorax, present on admission.  - Continue rib fracture protocol.  - Continue to encourage incentive spirometer use and adequate pulmonary hygiene.  Currently pulling 2250 mL on I.S.  - Monitor PIC score.  - Appreciate APS evaluation and recommendations.  - Continue multimodal analgesic regimen.  - Supplemental oxygen via nasal cannula as needed to maintain saturations greater than or equal to 94%.  - Repeat chest x-ray 8/18: No interval development of left pneumothorax or pleural fluid. Known rib fractures.   - PT and OT evaluation and treatment as indicated.  - Outpatient follow-up in the trauma clinic for re-evaluation in approximately 2 weeks.

## 2024-08-19 NOTE — OCCUPATIONAL THERAPY NOTE
"    Occupational Therapy Evaluation     Patient Name: Afua Menjivar  Today's Date: 8/19/2024  Problem List  Principal Problem:    Fracture of multiple ribs of right side  Active Problems:    Fall    Hypothyroidism    Past Medical History  No past medical history on file.  Past Surgical History  No past surgical history on file.      08/19/24 0922   OT Last Visit   OT Visit Date 08/19/24   Note Type   Note type Evaluation   Pain Assessment   Pain Assessment Tool 0-10   Pain Score 4   Pain Location/Orientation Orientation: Left;Location: Rib Cage   Pain Onset/Description Onset: Ongoing;Descriptor: Aching;Descriptor: Discomfort   Patient's Stated Pain Goal No pain   Hospital Pain Intervention(s) Repositioned;Ambulation/increased activity;Emotional support   Restrictions/Precautions   Weight Bearing Precautions Per Order No   Other Precautions Pain   Home Living   Type of Home House   Home Layout Two level  (3 [address] garage)   Bathroom Shower/Tub Walk-in shower   Bathroom Toilet Raised   Bathroom Equipment Other (Comment)  (denies any)   Home Equipment Cane  (unused PTA)   Additional Comments Pt lives in 2 SH, 3 [address] garage, 1st or 2nd floor setup   Prior Function   Level of [place] Independent with ADLs;Independent with functional mobility;Independent with IADLS   Lives With Spouse   Receives Help From Family   IADLs Independent with driving;Independent with meal prep;Independent with medication management   Falls in the last 6 months 1 to 4   Vocational Works at home   Comments (+)    Lifestyle   Autonomy I w/ ADLS/IADLS, transfers and functional mobility PTA   Reciprocal Relationships Lives w/ spouse   Service to Others works from home   Intrinsic Gratification using the hot tub   Subjective   Subjective \"I'm just so dizzy\"   ADL   Eating Assistance 7  Independent   Grooming Assistance 7  Independent   UB Bathing Assistance 5  Supervision/Setup   LB Bathing Assistance 5  Supervision/Setup   UB Dressing " Assistance 5  Supervision/Setup   LB Dressing Assistance 5  Supervision/Setup   Toileting Assistance  5  Supervision/Setup   Functional Assistance 5  Supervision/Setup   Bed Mobility   Supine to Sit 6  Modified independent   Additional items HOB elevated   Sit to Supine Unable to assess   Additional Comments sat EOB w/ G balance/trunk control   Transfers   Sit to Stand 5  Supervision   Additional items Verbal cues   Stand to Sit 5  Supervision   Additional items Verbal cues   Additional Comments no AD/DME used   Functional Mobility   Functional Mobility 5  Supervision   Additional Comments no AD/DME used; BP after mobility 163/100; RN notified   Balance   Static Sitting Normal   Dynamic Sitting Good   Static Standing Fair +   Dynamic Standing Fair +   Ambulatory Fair +   Activity Tolerance   Activity Tolerance Patient limited by fatigue;Other (Comment)  (dizziness)   Medical Staff Made Aware PT, RN   Nurse Made Aware yes   RUE Assessment   RUE Assessment WFL   LUE Assessment   LUE Assessment WFL   Hand Function   Gross Motor Coordination Functional   Fine Motor Coordination Functional   Sensation   Light Touch No apparent deficits   Psychosocial   Psychosocial (WDL) WDL   Cognition   Overall Cognitive Status WFL   Arousal/Participation Responsive;Cooperative   Attention Within functional limits   Orientation Level Oriented X4   Memory Within functional limits   Following Commands Follows all commands and directions without difficulty   Comments pt is pleasant and cooperative   Assessment   Limitation Decreased endurance   Prognosis Fair   Assessment Pt is a 53 y/o female seen for OT eval s/p adm to [facility] w/ R sided rib fractures after falling while getting out of hot tub. Pt is dx'd w/  R sided rib fractures. Pt  has no past medical history on file. Pt with active OT orders and activity as tolerated orders. Pt lives with her spouse in 2 , [facility], 1st/2nd floor setup available. Pt was I w/ ADLS and IADLS,  drove, & required no use of DME PTA. Pt is not demonstrating any significant deficits in occupational performance at this time. Overall is functioning at a S level for all functional tasks at this time. Reports no concerns regarding D/C home once medically stable and dizziness subsides. Reports spouse is home to assist as needed. Has no further immediate acute skilled OT needs; will D/C OT at this time.   Goals   Patient Goals to be less dizzy   Discharge Recommendation   Rehab Resource Intensity Level, OT No post-acute rehabilitation needs   Additional Comments  The patient's raw score on the AM-PAC Daily Activity Inpatient Short Form is 24. A raw score of greater than or equal to 19 suggests the patient may benefit from discharge to home. Please refer to the recommendation of the Occupational Therapist for safe discharge planning.   Additional Comments 2 Pt seen as a co-session due to the patient's co-morbidities, clinically unstable presentation, and present impairments which are a regression from the patient's baseline.   AM-PAC Daily Activity Inpatient   Lower Body Dressing 4   Bathing 4   Toileting 4   Upper Body Dressing 4   Grooming 4   Eating 4   Daily Activity Raw Score 24   Daily Activity Standardized Score (Calc for Raw Score >=11) 57.54   AM-PAC Applied Cognition Inpatient   Following a Speech/Presentation 4   Understanding Ordinary Conversation 4   Taking Medications 4   Remembering Where Things Are Placed or Put Away 4   Remembering List of 4-5 Errands 4   Taking Care of Complicated Tasks 4   Applied Cognition Raw Score 24   Applied Cognition Standardized Score 62.21   End of Consult   Education Provided Yes   Patient Position at End of Consult Seated edge of bed;All needs within reach   Nurse Communication Nurse aware of consult       Yanely Noble MS, OTR/L

## 2024-08-19 NOTE — PHYSICAL THERAPY NOTE
Physical Therapy Evaluation    Patient's Name: Afua Menjivar    Admitting Diagnosis  Rib fractures [S22.49XA]  Unspecified multiple injuries, initial encounter [T07.XXXA]    Problem List  Patient Active Problem List   Diagnosis    Fracture of multiple ribs of right side    Fall    Hypothyroidism       Past Medical History  No past medical history on file.    Past Surgical History  No past surgical history on file.     08/19/24 0921   PT Last Visit   PT Visit Date 08/19/24   Note Type   Note type Evaluation   Pain Assessment   Pain Assessment Tool 0-10   Pain Score No Pain   Pain Location/Orientation Orientation: Left;Location: Rib Cage   Restrictions/Precautions   Weight Bearing Precautions Per Order No   Other Precautions Pain  (rib fx protocol, dizziness)   Home Living   Type of Home House   Home Layout Two level  (3 [address] thru garage)   Bathroom Shower/Tub Walk-in shower   Bathroom Toilet Raised   Home Equipment Cane   Prior Function   Level of [place] Independent with ADLs;Independent with functional mobility;Independent with IADLS  (I community ambulator w/o AD)   Lives With Spouse   Receives Help From Family   IADLs Independent with driving;Independent with meal prep;Independent with medication management   Falls in the last 6 months 1 to 4   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   RLE Assessment   RLE Assessment WFL   LLE Assessment   LLE Assessment WFL   Coordination   Movements are Fluid and Coordinated 1   Bed Mobility   Supine to Sit 6  Modified independent   Additional items HOB elevated   Additional Comments Pt greeted in supine.   Transfers   Sit to Stand 5  Supervision   Additional items Verbal cues   Stand to Sit 5  Supervision   Additional items Verbal cues   Additional Comments no AD   Ambulation/Elevation    Gait pattern Excessively slow;Short stride   Gait Assistance 5  Supervision   Additional items Verbal cues   Assistive Device None   Distance 50'   Stair Management Assistance 5  Supervision   Stair Management Technique One rail R;Reciprocal   Number of Stairs 7   Balance   Static Sitting Normal   Dynamic Sitting Good   Static Standing Fair +   Dynamic Standing Fair +   Ambulatory Fair +   Endurance Deficit   Endurance Deficit Yes   Endurance Deficit Description dizziness   Activity Tolerance   Activity Tolerance Patient tolerated treatment well   Medical Staff Made Aware PRANAY Byrne CM   Nurse Made Aware yes - cleared + updated   Assessment   Assessment Pt is 54 y.o. female seen for a PT evaluation s/p admit to [facility] on 8/18/2024. Pt presenting s/p fall coming out of hot tub w/ rib fx 8-10 and small RANDAL. Please see above for other active problem list / PMH. PT now consulted to assess functional mobility and needs for safe d/c planning. Prior to admission, pt was I + active w/o AD, lives w/ spouse in a house w/ stairs. Currently pt is Jonathan for bed skills; S for functional transfers; S for ambulation w/o AD; S for stair negotiation. Pt presents near functional baseline. Encouraged pt to mobilize w/ spouse when dizziness clears. No further skilled acute PT needs. Will d/c from caseload.   Goals   Patient Goals less dizzy, go home   Plan   PT Frequency   (d/c PT)   Discharge Recommendation   Rehab Resource Intensity Level, PT No post-acute rehabilitation needs   AM-PAC Basic Mobility Inpatient   Turning in Flat Bed Without Bedrails 4   Lying on Back to Sitting on Edge of Flat Bed Without Bedrails 4   Moving Bed to Chair 3   Standing Up From Chair Using Arms 3   Walk in Room 3   Climb 3-5 Stairs With Railing 3   Basic Mobility Inpatient Raw Score 20   Basic Mobility Standardized Score 43.99   [facility] Highest Level Of Mobility   -HLM Goal 6: Walk 10 steps or more   -HLM Achieved 7: Walk 25 feet or  more   End of Consult   Patient Position at End of Consult All needs within reach;Seated edge of bed     Jasmin Duncan, PT, DPT

## 2024-08-19 NOTE — RESPIRATORY THERAPY NOTE
"RT Protocol Note  Afua Menjivar 54 y.o. female MRN: [id number]  Unit/Bed#: [facility] 626-01 Encounter: [id number]    Assessment    Active Problems:    Fracture of multiple ribs of right side      Home Pulmonary Medications:  none       No past medical history on file.  Social History     Socioeconomic History    Marital status: /Civil Union     Spouse name: Not on file    Number of children: Not on file    Years of education: Not on file    Highest education level: Not on file   Occupational History    Not on file   Tobacco Use    Smoking status: Never    Smokeless tobacco: Never   Substance and Sexual Activity    Alcohol use: Yes    Drug use: Not on file    Sexual activity: Not on file   Other Topics Concern    Not on file   Social History Narrative    Not on file     Social Determinants of Health     Financial Resource Strain: Not on file   Food Insecurity: Not on file   Transportation Needs: Not on file   Physical Activity: Not on file   Stress: Not on file   Social Connections: Unknown (6/18/2024)    Received from [employer]     How often do you feel lonely or isolated from those around you? (Adult - for ages 18 years and over): Not on file   Intimate Partner Violence: Not on file   Housing Stability: Not on file       Subjective         Objective    Physical Exam:   Assessment Type: (P) Assess only  General Appearance: (P) Awake, Alert  Respiratory Pattern: (P) Normal  Chest Assessment: (P) Chest expansion symmetrical  Bilateral Breath Sounds: (P) Diminished    Vitals:  Blood pressure 142/88, pulse (!) 54, temperature 97.5 °F (36.4 °C), resp. rate 16, height 5' 9\" (1.753 m), weight 72.6 kg (160 lb), SpO2 98%.          Imaging and other studies: I have personally reviewed pertinent reports.            Plan       Airway Clearance Plan: Incentive Spirometer     Resp Comments: (P) pt is performing IS independantly and acheiving above goal no other respiratory intervetnion is needed at this " time will d/c from airway clerance protocol

## 2024-08-19 NOTE — DISCHARGE INSTR - AVS FIRST PAGE
Traumatic Rib Fracture Discharge Instructions:    Your rib fractures will take time to heal. Rib fractures typically take at least 6-8 weeks to heal and may take longer.    Activity:  - Walking and normal light activities are encouraged. Normal daily activities including climbing steps are okay.  - Avoid lifting greater than 10 pounds, any strenuous activities and/or exercise, and contact sports until cleared by the trauma service.  - Continue using the incentive spirometer at least 10 times every hour while awake.    Return to work:    - You may return to work.    Medications:    - You should continue your current medication regimen after discharge unless otherwise instructed. Please refer to your discharge medication list for further details.  - Please take the pain medications as directed.  - You are encouraged to use non-narcotic pain medications first and whenever possible. Reserve the use of narcotic pain medication for moderate to severe pain not controlled by non-narcotic medications.  - No driving while taking narcotic pain medications.  - You may become constipated, especially if taking pain medications. You may take any over the counter stool softeners or laxatives as needed. Examples: Milk of Magnesia, Colace, Senna.    Additional Instructions:  - If you have any questions or concerns after discharge please call the office.  - Call office or return to ER if fever greater than 101, chills, worsening/uncontrollable pain, develop productive cough, increasing shortness of breath, and/or difficulty breathing.

## 2024-08-19 NOTE — INCIDENTAL FINDINGS
The following findings require follow up:  Radiographic finding   Findin.3 cm nonobstructing right renal calculus.     Simple hepatic cyst(s).      Follow up should be done within 1 month(s)    Please notify the following clinician to assist with the follow up:   Dr. Velasco    Incidental finding results were discussed with the Patient by Gena Hoffman PA-C on 24.   They expressed understanding and all questions answered.

## 2024-08-20 NOTE — PLAN OF CARE
Problem: RESPIRATORY - ADULT  Goal: Achieves optimal ventilation and oxygenation  Description: INTERVENTIONS:  - Assess for changes in respiratory status  - Assess for changes in mentation and behavior  - Position to facilitate oxygenation and minimize respiratory effort  - Oxygen administered by appropriate delivery if ordered  - Initiate smoking cessation education as indicated  - Encourage broncho-pulmonary hygiene including cough, deep breathe, Incentive Spirometry  - Assess the need for suctioning and aspirate as needed  - Assess and instruct to report SOB or any respiratory difficulty  - Respiratory Therapy support as indicated  Outcome: Adequate for Discharge     Problem: MUSCULOSKELETAL - ADULT  Goal: Maintain or return mobility to safest level of function  Description: INTERVENTIONS:  - Assess patient's ability to carry out ADLs; assess patient's baseline for ADL function and identify physical deficits which impact ability to perform ADLs (bathing, care of mouth/teeth, toileting, grooming, dressing, etc.)  - Assess/evaluate cause of self-care deficits   - Assess range of motion  - Assess patient's mobility  - Assess patient's need for assistive devices and provide as appropriate  - Encourage maximum independence but intervene and supervise when necessary  - Involve family in performance of ADLs  - Assess for home care needs following discharge   - Consider OT consult to assist with ADL evaluation and planning for discharge  - Provide patient education as appropriate  Outcome: Adequate for Discharge  Goal: Maintain proper alignment of affected body part  Description: INTERVENTIONS:  - Support, maintain and protect limb and body alignment  - Provide patient/ family with appropriate education  Outcome: Adequate for Discharge

## 2024-10-30 ENCOUNTER — OFFICE VISIT (OUTPATIENT)
Dept: FAMILY MEDICINE CLINIC | Facility: CLINIC | Age: 54
End: 2024-10-30
Payer: COMMERCIAL

## 2024-10-30 ENCOUNTER — APPOINTMENT (OUTPATIENT)
Dept: RADIOLOGY | Facility: CLINIC | Age: 54
End: 2024-10-30
Payer: COMMERCIAL

## 2024-10-30 VITALS
DIASTOLIC BLOOD PRESSURE: 70 MMHG | HEIGHT: 69 IN | HEART RATE: 73 BPM | WEIGHT: 166 LBS | BODY MASS INDEX: 24.59 KG/M2 | SYSTOLIC BLOOD PRESSURE: 118 MMHG | TEMPERATURE: 98.5 F | OXYGEN SATURATION: 98 %

## 2024-10-30 DIAGNOSIS — E03.9 HYPOTHYROIDISM, UNSPECIFIED TYPE: ICD-10-CM

## 2024-10-30 DIAGNOSIS — Z00.00 ANNUAL PHYSICAL EXAM: ICD-10-CM

## 2024-10-30 DIAGNOSIS — Z00.00 ROUTINE HEALTH MAINTENANCE: ICD-10-CM

## 2024-10-30 DIAGNOSIS — S22.42XD CLOSED FRACTURE OF MULTIPLE RIBS OF LEFT SIDE WITH ROUTINE HEALING, SUBSEQUENT ENCOUNTER: Primary | ICD-10-CM

## 2024-10-30 DIAGNOSIS — Z78.0 POSTMENOPAUSAL ESTROGEN DEFICIENCY: ICD-10-CM

## 2024-10-30 DIAGNOSIS — S22.42XD CLOSED FRACTURE OF MULTIPLE RIBS OF LEFT SIDE WITH ROUTINE HEALING, SUBSEQUENT ENCOUNTER: ICD-10-CM

## 2024-10-30 PROBLEM — Z90.49 HISTORY OF APPENDECTOMY: Status: ACTIVE | Noted: 2024-10-30

## 2024-10-30 PROBLEM — Z87.19 HISTORY OF INTUSSUSCEPTION: Status: ACTIVE | Noted: 2024-10-30
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
## 2024-10-30 PROCEDURE — 71046 X-RAY EXAM CHEST 2 VIEWS: CPT

## 2024-10-30 PROCEDURE — 99214 OFFICE O/P EST MOD 30 MIN: CPT

## 2024-10-30 PROCEDURE — 99386 PREV VISIT NEW AGE 40-64: CPT

## 2024-10-30 RX ORDER — LEVOTHYROXINE SODIUM 125 UG/1
1 TABLET ORAL DAILY
COMMUNITY
Start: 2024-10-16

## 2024-10-30 RX ORDER — SENNOSIDES 8.6 MG
650 CAPSULE ORAL EVERY 8 HOURS PRN
Qty: 30 TABLET | Refills: 0 | Status: SHIPPED | OUTPATIENT
Start: 2024-10-30

## 2024-10-30 NOTE — ASSESSMENT & PLAN NOTE
Hx Dx hypothyroidism from previous provider  Per pt previous PCP recently increase levothyroxine but pt is unsure of dosage  States dose in chart is in-accurate  Plan for pt to send dosage to me via [employer] for update  Will also get repeat Tsh & ABs as none exists in system  Orders:    TSH, 3rd generation with Free T4 reflex; Future    Thyroid Antibodies Panel; Future

## 2024-10-30 NOTE — PATIENT INSTRUCTIONS
"Patient Education     Routine physical for adults   The Basics   Written by the doctors and editors at [facility]   What is a physical? -- A physical is a routine visit, or \"check-up,\" with your doctor. You might also hear it called a \"wellness visit\" or \"preventive visit.\"  During each visit, the doctor will:   Ask about your physical and mental health   Ask about your habits, behaviors, and lifestyle   Do an exam   Give you vaccines if needed   Talk to you about any medicines you take   Give advice about your health   Answer your questions  Getting regular check-ups is an important part of taking care of your health. It can help your doctor find and treat any problems you have. But it's also important for preventing health problems.  A routine physical is different from a \"sick visit.\" A sick visit is when you see a doctor because of a health concern or problem. Since physicals are scheduled ahead of time, you can think about what you want to ask the doctor.  How often should I get a physical? -- It depends on your age and health. In general, for people age 21 years and older:   If you are younger than 50 years, you might be able to get a physical every 3 years.   If you are 50 years or older, your doctor might recommend a physical every year.  If you have an ongoing health condition, like diabetes or high blood pressure, your doctor will probably want to see you more often.  What happens during a physical? -- In general, each visit will include:   Physical exam - The doctor or nurse will check your height, weight, heart rate, and blood pressure. They will also look at your eyes and ears. They will ask about how you are feeling and whether you have any symptoms that bother you.   Medicines - It's a good idea to bring a list of all the medicines you take to each doctor visit. Your doctor will talk to you about your medicines and answer any questions. Tell them if you are having any side effects that bother you. You " "should also tell them if you are having trouble paying for any of your medicines.   Habits and behaviors - This includes:   Your diet   Your exercise habits   Whether you smoke, drink alcohol, or use drugs   Whether you are sexually active   Whether you feel safe at home  Your doctor will talk to you about things you can do to improve your health and lower your risk of health problems. They will also offer help and support. For example, if you want to quit smoking, they can give you advice and might prescribe medicines. If you want to improve your diet or get more physical activity, they can help you with this, too.   Lab tests, if needed - The tests you get will depend on your age and situation. For example, your doctor might want to check your:   Cholesterol   Blood sugar   Iron level   Vaccines - The recommended vaccines will depend on your age, health, and what vaccines you already had. Vaccines are very important because they can prevent certain serious or deadly infections.   Discussion of screening - \"Screening\" means checking for diseases or other health problems before they cause symptoms. Your doctor can recommend screening based on your age, risk, and preferences. This might include tests to check for:   Cancer, such as breast, prostate, cervical, ovarian, colorectal, prostate, lung, or skin cancer   Sexually transmitted infections, such as chlamydia and gonorrhea   Mental health conditions like depression and anxiety  Your doctor will talk to you about the different types of screening tests. They can help you decide which screenings to have. They can also explain what the results might mean.   Answering questions - The physical is a good time to ask the doctor or nurse questions about your health. If needed, they can refer you to other doctors or specialists, too.  Adults older than 65 years often need other care, too. As you get older, your doctor will talk to you about:   How to prevent falling at " home   Hearing or vision tests   Memory testing   How to take your medicines safely   Making sure that you have the help and support you need at home  All topics are updated as new evidence becomes available and our peer review process is complete.  This topic retrieved from [employer] on: May 02, 2024.  Topic 967629 Version 1.0  Release: 32.4.3 - C32.122  © 2024 UpToDate, Inc. and/or its affiliates. All rights reserved.  Consumer Information Use and Disclaimer   Disclaimer: This generalized information is a limited summary of diagnosis, treatment, and/or medication information. It is not meant to be comprehensive and should be used as a tool to help the user understand and/or assess potential diagnostic and treatment options. It does NOT include all information about conditions, treatments, medications, side effects, or risks that may apply to a specific patient. It is not intended to be medical advice or a substitute for the medical advice, diagnosis, or treatment of a health care provider based on the health care provider's examination and assessment of a patient's specific and unique circumstances. Patients must speak with a health care provider for complete information about their health, medical questions, and treatment options, including any risks or benefits regarding use of medications. This information does not endorse any treatments or medications as safe, effective, or approved for treating a specific patient. UpToDate, Inc. and its affiliates disclaim any warranty or liability relating to this information or the use thereof.The use of this information is governed by the Terms of Use, available at https://www.wol[employer]in[employer]quwer.com/en/know/clinical-effectiveness-terms. 2024© UpToDate, Inc. and its affiliates and/or licensors. All rights reserved.  Copyright   © 2024 UpToDate, Inc. and/or its affiliates. All rights reserved.

## 2024-10-30 NOTE — ASSESSMENT & PLAN NOTE
Pt presents after trauma w/ fall 8/24 multiple rib fractures  Was seen in ED and discharged 8/24  Presents today for second opinion on how to proceed   10/1 CXR reviewed noted healing of rib Fx but mild displacement  Chart review does note at discharge pt was to follow up with Trauma x2 weeks  Pt never got to follow up  Does note she is still currently in pain pleuritic in nature but does not want pain medication. Has been managing with OTC pain medication   Provided RX for CR acetaminophen and advised f/u PRN if pain worsens  Will also obtain repeat CXR as current record is only report only  Patient preference to f/u with ortho trauma for second opinion  Did  on routine healing and that pain is expected post trauma  Will obtain DXA given Fx   Follow up:call/return to office if symptoms worsen or fail to improve, if new onset hemoptysis, chest pain, difficulty breathing , shortness of breath, or changes in severity of pain report to the ED, 1 year for next annual, sooner PRN, and pt is in agreement.  Orders:    Ambulatory Referral to Orthopedic Surgery; Future    XR chest pa and lateral; Future    acetaminophen (TYLENOL) 650 mg CR tablet; Take 1 tablet (650 mg total) by mouth every 8 (eight) hours as needed for mild pain
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
## 2024-10-31 ENCOUNTER — TELEPHONE (OUTPATIENT)
Dept: ADMINISTRATIVE | Facility: OTHER | Age: 54
End: 2024-10-31

## 2024-10-31 NOTE — TELEPHONE ENCOUNTER
Upon review of the In Basket request and the patient's chart, initial outreach has been made via fax to facility. Please see Contacts section for details.     Thank you  Jaime Palacios MA

## 2024-10-31 NOTE — LETTER
Procedure Request Form: Cervical Cancer Screening and Mammogram      Date Requested: 10/31/24  Patient: Afua Menjivar  Patient : 1970   Referring Provider: Catrachito Zhao PA-C        Date of Procedure ________most recent reports______________________       The above patient has informed us that they have completed their   most recent Cervical Cancer Screening and Mammogram at your facility. Please complete   this form and attach all corresponding procedure reports/results.    Comments __________________________________________________________  ____________________________________________________________________  ____________________________________________________________________  ____________________________________________________________________    Facility Completing Procedure _________________________________________    Form Completed By (print name) _______________________________________      Signature __________________________________________________________      These reports are needed for  compliance.    Please fax this completed form and a copy of the procedure report to our office located at [address] [place] [address] as soon as possible to Fax [phone] attention Jaime: Phone [phone]    We thank you for your assistance in treating our mutual patient.

## 2024-10-31 NOTE — LETTER
Procedure Request Form: Colonoscopy      Date Requested: 10/31/24  Patient: Afua Menjivar  Patient : 1970   Referring Provider: Catrachito Zhao PA-C        Date of Procedure ______2022-2024 report________________________       The above patient has informed us that they have completed their   most recent Colonoscopy at your facility. Please complete   this form and attach all corresponding procedure reports/results.    Comments __________________________________________________________  ____________________________________________________________________  ____________________________________________________________________  ____________________________________________________________________    Facility Completing Procedure _________________________________________    Form Completed By (print name) _______________________________________      Signature __________________________________________________________      These reports are needed for  compliance.    Please fax this completed form and a copy of the procedure report to our office located at [address] [place] [address] as soon as possible to Fax [phone] attention Jaime: Phone [phone]    We thank you for your assistance in treating our mutual patient.

## 2024-10-31 NOTE — TELEPHONE ENCOUNTER
----- Message from Petty SHANE sent at 10/30/2024  2:06 PM EDT -----  Regarding: Pap Smear  10/30/24 2:06 PM    Hello, our patient Afua Menjivar has had Pap Smear (HPV) aka Cervical Cancer Screening completed/performed. Please assist in updating the patient chart by making an External outreach to [facility] - OB/GYN-Dr. Kaden Rodriguez facility located in [place], [place] The date of service is 5407-0142.    Thank you,  VERENICE Nuñez PG, LPN  [facility]  10/30/24 2:09 PM    Hello, our patient Afua Menjivar has had CRC: Colonoscopy completed/performed. Please assist in updating the patient chart by making an External outreach to [facility] facility located in [place] The date of service is [id number].    Thank you,  VERENICE Nuñez PG, LPN [facility]  10/30/24 2:08 PM    Hello, our patient Afua Menjivar has had Mammogram completed/performed. Please assist in updating the patient chart by making an External outreach to [facility] facility located in [place] The date of service is [id number].    Thank you,  VERENICE Nuñez PG

## 2024-11-04 NOTE — TELEPHONE ENCOUNTER
Upon review of the In Basket request we were able to locate, review, and update the patient chart as requested for Mammogram and Pap Smear (HPV) aka Cervical Cancer Screening.    Any additional questions or concerns should be emailed to the Practice Liaisons via the appropriate education email address, please do not reply via In Basket.    Thank you  Jaime Palacios MA   [facility]

## 2024-11-04 NOTE — TELEPHONE ENCOUNTER
As a follow-up, a second attempt has been made for outreach via fax to facility. Please see Contacts section for details.    Thank you  Jaime Palacios MA

## 2024-11-07 NOTE — TELEPHONE ENCOUNTER
Upon review of the In Basket request we were able to locate, review, and update the patient chart as requested for CRC: Colonoscopy.    Any additional questions or concerns should be emailed to the Practice Liaisons via the appropriate education email address, please do not reply via In Basket.    Thank you  Jaime Palacios MA   [facility]

## 2025-01-06 ENCOUNTER — APPOINTMENT (OUTPATIENT)
Dept: LAB | Facility: MEDICAL CENTER | Age: 55
End: 2025-01-06
Payer: COMMERCIAL

## 2025-01-06 ENCOUNTER — HOSPITAL ENCOUNTER (OUTPATIENT)
Dept: RADIOLOGY | Facility: MEDICAL CENTER | Age: 55
Discharge: HOME | End: 2025-01-06
Payer: COMMERCIAL

## 2025-01-06 DIAGNOSIS — E03.9 HYPOTHYROIDISM, UNSPECIFIED TYPE: ICD-10-CM

## 2025-01-06 DIAGNOSIS — Z00.00 ROUTINE HEALTH MAINTENANCE: ICD-10-CM

## 2025-01-06 DIAGNOSIS — Z78.0 POSTMENOPAUSAL ESTROGEN DEFICIENCY: ICD-10-CM

## 2025-01-06 LAB
ALBUMIN SERPL BCG-MCNC: 4.3 G/DL (ref 3.5–5)
ALP SERPL-CCNC: 74 U/L (ref 34–104)
ALT SERPL W P-5'-P-CCNC: 19 U/L (ref 7–52)
ANION GAP SERPL CALCULATED.3IONS-SCNC: 11 MMOL/L (ref 4–13)
AST SERPL W P-5'-P-CCNC: 28 U/L (ref 13–39)
BILIRUB SERPL-MCNC: 0.41 MG/DL (ref 0.2–1)
BUN SERPL-MCNC: 16 MG/DL (ref 5–25)
CALCIUM SERPL-MCNC: 9.6 MG/DL (ref 8.4–10.2)
CHLORIDE SERPL-SCNC: 106 MMOL/L (ref 96–108)
CHOLEST SERPL-MCNC: 238 MG/DL (ref ?–200)
CO2 SERPL-SCNC: 24 MMOL/L (ref 21–32)
CREAT SERPL-MCNC: 0.85 MG/DL (ref 0.6–1.3)
GFR SERPL CREATININE-BSD FRML MDRD: 77 ML/MIN/1.73SQ M
GLUCOSE P FAST SERPL-MCNC: 83 MG/DL (ref 65–99)
HDLC SERPL-MCNC: 97 MG/DL
LDLC SERPL CALC-MCNC: 129 MG/DL (ref 0–100)
NONHDLC SERPL-MCNC: 141 MG/DL
POTASSIUM SERPL-SCNC: 4 MMOL/L (ref 3.5–5.3)
PROT SERPL-MCNC: 7.8 G/DL (ref 6.4–8.4)
SODIUM SERPL-SCNC: 141 MMOL/L (ref 135–147)
T4 FREE SERPL-MCNC: 0.98 NG/DL (ref 0.61–1.12)
TRIGL SERPL-MCNC: 60 MG/DL (ref ?–150)
TSH SERPL DL<=0.05 MIU/L-ACNC: 0.22 UIU/ML (ref 0.45–4.5)

## 2025-01-06 PROCEDURE — 84439 ASSAY OF FREE THYROXINE: CPT

## 2025-01-06 PROCEDURE — 86800 THYROGLOBULIN ANTIBODY: CPT

## 2025-01-06 PROCEDURE — 77080 DXA BONE DENSITY AXIAL: CPT

## 2025-01-06 PROCEDURE — 36415 COLL VENOUS BLD VENIPUNCTURE: CPT

## 2025-01-06 PROCEDURE — 86376 MICROSOMAL ANTIBODY EACH: CPT

## 2025-01-06 PROCEDURE — 80061 LIPID PANEL: CPT

## 2025-01-06 PROCEDURE — 84443 ASSAY THYROID STIM HORMONE: CPT

## 2025-01-06 PROCEDURE — 80053 COMPREHEN METABOLIC PANEL: CPT

## 2025-01-07 LAB
THYROGLOB AB SERPL-ACNC: 1.4 IU/ML (ref 0–0.9)
THYROPEROXIDASE AB SERPL-ACNC: 113 IU/ML (ref 0–34)

## 2025-01-08 ENCOUNTER — RESULTS FOLLOW-UP (OUTPATIENT)
Dept: FAMILY MEDICINE CLINIC | Facility: CLINIC | Age: 55
End: 2025-01-08

## 2025-01-08 DIAGNOSIS — E03.9 HYPOTHYROIDISM, UNSPECIFIED TYPE: Primary | ICD-10-CM

## 2025-01-08 RX ORDER — LEVOTHYROXINE SODIUM 112 UG/1
112 TABLET ORAL
Qty: 100 TABLET | Refills: 3 | Status: SHIPPED | OUTPATIENT
Start: 2025-01-08 | End: 2025-01-09 | Stop reason: SDUPTHER

## 2025-01-08 NOTE — TELEPHONE ENCOUNTER
Patient returned call.  I read doctor's note, verbatim.  She said that her previous doctor had switched her levothyroxine from 112 mcg to 125 mcg.  She was on 125 mcg before doing her bloodwork.  Patient says that she has been feeling a bit tired lately.  She asked if there would be an adjustment in medication.  Please contact patient and advise.  Thank you.

## 2025-01-09 DIAGNOSIS — E03.9 HYPOTHYROIDISM, UNSPECIFIED TYPE: ICD-10-CM

## 2025-01-09 RX ORDER — LEVOTHYROXINE SODIUM 112 UG/1
112 TABLET ORAL
Qty: 100 TABLET | Refills: 3 | Status: SHIPPED | OUTPATIENT
Start: 2025-01-09

## 2025-01-09 NOTE — TELEPHONE ENCOUNTER
Patient would like her:    levothyroxine 112 mcg tablet   Take 1 tablet (112 mcg total) by mouth daily in the early morning     Sent to a different pharmacy.  She would like the script sent to:  [id number] - [place] - [address] [phone]     Please advise, thank you.

## 2025-04-24 ENCOUNTER — TELEPHONE (OUTPATIENT)
Age: 55
End: 2025-04-24

## 2025-04-24 ENCOUNTER — OFFICE VISIT (OUTPATIENT)
Dept: FAMILY MEDICINE CLINIC | Facility: CLINIC | Age: 55
End: 2025-04-24
Payer: COMMERCIAL

## 2025-04-24 VITALS
HEART RATE: 66 BPM | HEIGHT: 69 IN | OXYGEN SATURATION: 97 % | TEMPERATURE: 98.6 F | DIASTOLIC BLOOD PRESSURE: 82 MMHG | SYSTOLIC BLOOD PRESSURE: 122 MMHG | BODY MASS INDEX: 24.59 KG/M2 | WEIGHT: 166 LBS

## 2025-04-24 DIAGNOSIS — R21 RASH OF BODY: Primary | ICD-10-CM

## 2025-04-24 PROCEDURE — 99213 OFFICE O/P EST LOW 20 MIN: CPT

## 2025-04-24 RX ORDER — TRIAMCINOLONE ACETONIDE 1 MG/G
CREAM TOPICAL 2 TIMES DAILY
Qty: 15 G | Refills: 0 | Status: SHIPPED | OUTPATIENT
Start: 2025-04-24

## 2025-04-24 NOTE — TELEPHONE ENCOUNTER
Pt called requesting a referral to a GYN close to [place]. Pt also asking for recommendations for a dermatologist in the area due to Eczema.  Pt also asking if Catrachito could treat her for this or would she need to see Dermatology?    Please advise

## 2025-04-24 NOTE — TELEPHONE ENCOUNTER
I can definitely initiate treatment for eczema, oftentimes we can get control of this in the primary care setting, and only refer to dermatology in instances where it does not respond to first-line medications.  I can also place a referral to an OB/GYN for patient, but if she plans on coming in we can discuss this then.  I have openings both today and tomorrow

## 2025-04-24 NOTE — PROGRESS NOTES
"Name: Afua Menjivar      : 1970      MRN: [id number]  Encounter Provider: Catrachito Zhao PA-C  Encounter Date: 2025   Encounter department: [facility]    Assessment & Plan  Rash of body  Pt c/o of a rash located medial surface of the left ankle  First noticed 1 year(s) ago, and has worsened since onset over last 4 months. Has been waxing an waning  has spread slightly proximally and distally  has attempted to alleviate their current sx with archana butter, gold bond anti itch which has not alleviated their sx   Described as pruritic and not painful  Pertinent negatives include no other rashes anywhere, denies recent outdoor activity, new lotions/body washes, new foods.   Examination revealing excoriated area of redness over the medial left ankle proximal to the malleolus  Given chronicity and Hx, suspicion for eczematous process, will treat with topical corticosteroids at this time and follow-up in 1 month if not improving will consider dermatology referral    Orders:    triamcinolone (KENALOG) 0.1 % cream; Apply topically 2 (two) times a day Use daily for about 2 weeks and assess response         History of Present Illness     Afua Menjivar is a 55 y.o. female  presenting for rash. She feels it may be eczema.        **Note: Portions of the record may have been created with voice recognition software.  Occasional wrong word or \"sound alike\" substitutions may have occurred due to the inherent limitations of voice recognition software.  Please read the chart carefully and recognize, using context, where substitutions have occurred. Please contact for further clarification, when necessary.     Rash  Pertinent negatives include no cough, fever, shortness of breath, sore throat or vomiting.     Review of Systems   Constitutional:  Negative for chills and fever.   HENT:  Negative for ear pain and sore throat.    Eyes:  Negative for pain and visual disturbance.   Respiratory:  Negative for " "cough and shortness of breath.    Cardiovascular:  Negative for chest pain and palpitations.   Gastrointestinal:  Negative for abdominal pain and vomiting.   Genitourinary:  Negative for dysuria and hematuria.   Musculoskeletal:  Negative for arthralgias and back pain.   Skin:  Positive for rash. Negative for color change.   Neurological:  Negative for seizures and syncope.   All other systems reviewed and are negative.    Past Medical History:   Diagnosis Date    Disease of thyroid gland 1991     Past Surgical History:   Procedure Laterality Date    APPENDECTOMY  1969    KNEE SURGERY  2017     Family History   Problem Relation Age of Onset    Cancer Mother         Skin    Hypertension Father     Prostate cancer Father     Hypertension Sister     Thyroid disease Sister      Social History     Tobacco Use    Smoking status: Former     Current packs/day: 1.00     Average packs/day: 1 pack/day for 5.0 years (5.0 ttl pk-yrs)     Types: Cigarettes    Smokeless tobacco: Never   Vaping Use    Vaping status: Never Used   Substance and Sexual Activity    Alcohol use: Yes     Alcohol/week: 5.0 standard drinks of alcohol     Types: 3 Glasses of wine, 2 Cans of beer per week    Drug use: Never    Sexual activity: Yes     Partners: Female     Birth control/protection: Female Sterilization     Current Outpatient Medications on File Prior to Visit   Medication Sig    levothyroxine 112 mcg tablet Take 1 tablet (112 mcg total) by mouth daily in the early morning    [DISCONTINUED] acetaminophen (TYLENOL) 650 mg CR tablet Take 1 tablet (650 mg total) by mouth every 8 (eight) hours as needed for mild pain     No Known Allergies  Immunization History   Administered Date(s) Administered    Tdap 10/31/2012     Objective   /82   Pulse 66   Temp 98.6 °F (37 °C)   Ht 5' 9\" (1.753 m)   Wt 75.3 kg (166 lb)   SpO2 97%   BMI 24.51 kg/m²     Physical Exam  Vitals and nursing note reviewed.   Constitutional:       General: She is not " in acute distress.     Appearance: She is well-developed.   HENT:      Head: Normocephalic and atraumatic.   Eyes:      Conjunctiva/sclera: Conjunctivae normal.   Cardiovascular:      Rate and Rhythm: Normal rate and regular rhythm.      Heart sounds: Normal heart sounds. No murmur heard.  Pulmonary:      Effort: Pulmonary effort is normal. No respiratory distress.      Breath sounds: Normal breath sounds. No stridor. No wheezing, rhonchi or rales.   Abdominal:      General: Abdomen is flat. There is no distension.   Musculoskeletal:         General: No swelling.      Cervical back: Neck supple.   Skin:     General: Skin is warm and dry.      Findings: Erythema and rash (Rashs medial ankle of the left leg, see clinical media) present.   Neurological:      General: No focal deficit present.      Mental Status: She is alert and oriented to person, place, and time.   Psychiatric:         Mood and Affect: Mood normal.

## 2025-05-09 DIAGNOSIS — Z01.419 WOMEN'S ANNUAL ROUTINE GYNECOLOGICAL EXAMINATION: Primary | ICD-10-CM

## 2025-05-09 DIAGNOSIS — R21 RASH OF BODY: Primary | ICD-10-CM

## 2025-05-09 RX ORDER — METHYLPREDNISOLONE 4 MG/1
TABLET ORAL
Qty: 21 EACH | Refills: 0 | Status: SHIPPED | OUTPATIENT
Start: 2025-05-09 | End: 2025-05-13 | Stop reason: SDUPTHER

## 2025-05-13 ENCOUNTER — NURSE TRIAGE (OUTPATIENT)
Age: 55
End: 2025-05-13

## 2025-05-13 DIAGNOSIS — R21 RASH OF BODY: ICD-10-CM

## 2025-05-13 RX ORDER — METHYLPREDNISOLONE 4 MG/1
TABLET ORAL
Qty: 21 EACH | Refills: 0 | Status: SHIPPED | OUTPATIENT
Start: 2025-05-13

## 2025-05-13 NOTE — TELEPHONE ENCOUNTER
"Reason for Disposition   Prescription prescribed recently is not at pharmacy and triager has access to patient's EMR and prescription is recorded in the EMR    Answer Assessment - Initial Assessment Questions  1. NAME of MEDICINE: \"What medicine(s) are you calling about?\"      methylPREDNISolone 4 MG tablet therapy pack  2. QUESTION: \"What is your question?\" (e.g., double dose of medicine, side effect)      [facility] to [facility]  3. PRESCRIBER: \"Who prescribed the medicine?\" Reason: if prescribed by specialist, call should be referred to that group.      Catrachito Zhoa    Protocols used: Medication Question Call-Adult-[place]    "

## 2025-05-20 NOTE — PROGRESS NOTES
"Name: Afua Menjivar      : 1970      MRN: [id number]  Encounter Provider: Catrachito Zhao PA-C  Encounter Date: 2025   Encounter department: [facility]    Assessment & Plan  Concentration deficit  Pt notes that in the middle of conversation she may forget her complete train of thought, voices concern for memory issues  She also notes that she has some concentration concerns as well  This is troublesome to her as she has an occupation where memory and recall is critical  MOCA performed today, score 28/30 normal , low clinical suspicion given age and risk factors for MCI or worse  In the light of normal MOCA, did discuss attention deficit as potential cause, and after discussing risk and benefits of medication, will trial atomoxetine 40 mg daily for 1 month and follow-up  Also will place psych referral as I advised patient to schedule this as if her symptoms continue and she does not have improvement with atomoxetine, she will need formal psychiatric evaluation to further confirm attention deficit as underlying cause and initiation of controlled medication if necessary  She is in agreement  Orders:    atoMOXetine (STRATTERA) 40 mg capsule; Take 1 capsule (40 mg total) by mouth daily    Ambulatory referral to Psych Services; Future    Rash of body  Pt notes ankle rash has improved with use of topical steroid and emollient    Exam reveals slight residual erythema to medial Lt ankle bot overall improved  Continue PRN steroid and emmollient use            History of Present Illness     Afua Menjivar is a 55 y.o. female  presenting for 1 month follow-up for rash and to further discuss cognitive concerns.        **Note: Portions of the record may have been created with voice recognition software.  Occasional wrong word or \"sound alike\" substitutions may have occurred due to the inherent limitations of voice recognition software.  Please read the chart carefully and recognize, using context, " where substitutions have occurred. Please contact for further clarification, when necessary.       Review of Systems   Constitutional:  Negative for chills and fever.   HENT:  Negative for ear pain and sore throat.    Eyes:  Negative for pain and visual disturbance.   Respiratory:  Negative for cough and shortness of breath.    Cardiovascular:  Negative for chest pain and palpitations.   Gastrointestinal:  Negative for abdominal pain and vomiting.   Genitourinary:  Negative for dysuria and hematuria.   Musculoskeletal:  Negative for arthralgias and back pain.   Skin:  Negative for color change and rash.   Neurological:  Negative for seizures and syncope.   Psychiatric/Behavioral:  Positive for decreased concentration.    All other systems reviewed and are negative.    Past Medical History:   Diagnosis Date    Disease of thyroid gland 1991     Past Surgical History:   Procedure Laterality Date    APPENDECTOMY  1969    KNEE SURGERY  2017     Family History   Problem Relation Age of Onset    Cancer Mother         Skin    Hypertension Father     Prostate cancer Father     Hypertension Sister     Thyroid disease Sister      Social History     Tobacco Use    Smoking status: Former     Current packs/day: 1.00     Average packs/day: 1 pack/day for 5.0 years (5.0 ttl pk-yrs)     Types: Cigarettes    Smokeless tobacco: Never   Vaping Use    Vaping status: Never Used   Substance and Sexual Activity    Alcohol use: Yes     Alcohol/week: 5.0 standard drinks of alcohol     Types: 3 Glasses of wine, 2 Cans of beer per week    Drug use: Never    Sexual activity: Yes     Partners: Female     Birth control/protection: Female Sterilization     Medications[1]  No Known Allergies  Immunization History   Administered Date(s) Administered    Tdap 10/31/2012     Objective   There were no vitals taken for this visit.    Physical Exam  Vitals and nursing note reviewed.   Constitutional:       General: She is not in acute distress.      Appearance: She is well-developed.   HENT:      Head: Normocephalic and atraumatic.     Eyes:      Conjunctiva/sclera: Conjunctivae normal.       Cardiovascular:      Rate and Rhythm: Normal rate and regular rhythm.      Heart sounds: No murmur heard.  Pulmonary:      Effort: Pulmonary effort is normal. No respiratory distress.      Breath sounds: Normal breath sounds.   Abdominal:      Palpations: Abdomen is soft.      Tenderness: There is no abdominal tenderness.     Musculoskeletal:         General: No swelling.      Cervical back: Neck supple.     Skin:     General: Skin is warm and dry.     Neurological:      Mental Status: She is alert and oriented to person, place, and time.     Psychiatric:         Mood and Affect: Mood normal.                [1]   Current Outpatient Medications on File Prior to Visit   Medication Sig    levothyroxine 112 mcg tablet Take 1 tablet (112 mcg total) by mouth daily in the early morning    methylPREDNISolone 4 MG tablet therapy pack Use as directed on package    triamcinolone (KENALOG) 0.1 % cream Apply topically 2 (two) times a day Use daily for about 2 weeks and assess response

## 2025-05-21 ENCOUNTER — OFFICE VISIT (OUTPATIENT)
Dept: FAMILY MEDICINE CLINIC | Facility: CLINIC | Age: 55
End: 2025-05-21
Payer: COMMERCIAL

## 2025-05-21 VITALS
HEART RATE: 61 BPM | HEIGHT: 69 IN | WEIGHT: 171.4 LBS | BODY MASS INDEX: 25.39 KG/M2 | OXYGEN SATURATION: 98 % | SYSTOLIC BLOOD PRESSURE: 128 MMHG | TEMPERATURE: 98.3 F | DIASTOLIC BLOOD PRESSURE: 88 MMHG

## 2025-05-21 DIAGNOSIS — R21 RASH OF BODY: ICD-10-CM

## 2025-05-21 DIAGNOSIS — R41.840 CONCENTRATION DEFICIT: Primary | ICD-10-CM

## 2025-05-21 PROCEDURE — 99213 OFFICE O/P EST LOW 20 MIN: CPT

## 2025-05-21 RX ORDER — ATOMOXETINE 40 MG/1
40 CAPSULE ORAL DAILY
Qty: 30 CAPSULE | Refills: 1 | Status: SHIPPED | OUTPATIENT
Start: 2025-05-21 | End: 2025-05-23 | Stop reason: SDUPTHER

## 2025-05-21 RX ORDER — ATOMOXETINE 40 MG/1
40 CAPSULE ORAL DAILY
Qty: 30 CAPSULE | Refills: 1 | Status: SHIPPED | OUTPATIENT
Start: 2025-05-21 | End: 2025-05-21

## 2025-05-23 ENCOUNTER — NURSE TRIAGE (OUTPATIENT)
Age: 55
End: 2025-05-23

## 2025-05-23 DIAGNOSIS — R41.840 CONCENTRATION DEFICIT: ICD-10-CM

## 2025-05-23 RX ORDER — ATOMOXETINE 40 MG/1
40 CAPSULE ORAL DAILY
Qty: 30 CAPSULE | Refills: 1 | Status: SHIPPED | OUTPATIENT
Start: 2025-05-23

## 2025-05-23 NOTE — TELEPHONE ENCOUNTER
Pt called in reporting [facility] in [place], [place] is now closed down.     Requests for us to please send her script of:   atoMOXetine (STRATTERA) 40 mg capsule to the following pharmacy:    [facility]  Address: [address] [address], [place] [address]  Phone: [phone]    Please advise & call pt back with update. Thank you!    Afua: [phone]

## 2025-05-23 NOTE — TELEPHONE ENCOUNTER
"FOLLOW UP: n/a    REASON FOR CONVERSATION: Send script to New Pharmacy and Medication Problem    SYMPTOMS: n/a    OTHER: patient requesting medication be [facility] [place] [place]. Medication is a non-delegated med and we aren't allowed to sign off on those. Please review and send to requested pharmacy which has been updated on pharmacy list.    DISPOSITION: Home Care      Reason for Disposition   Caller has NON-URGENT medicine question about med that PCP or specialist prescribed and triager unable to answer question    Answer Assessment - Initial Assessment Questions  1. NAME of MEDICINE: \"What medicine(s) are you calling about?\"      atoMOXetine (STRATTERA) 40 mg capsule     2. QUESTION: \"What is your question?\" (e.g., double dose of medicine, side effect)      Pt called in reporting [facility] in [place], [place] is now closed down.    Requests for us to please send her script of:   atoMOXetine (STRATTERA) 40 mg capsule to [facility]      3. PRESCRIBER: \"Who prescribed the medicine?\" Reason: if prescribed by specialist, call should be referred to that group.   Authorizing Provider: Catrachito Zhao PA-C Supervising Provider: Barrington Nazario MD    Protocols used: Medication Question Call-Adult-[place]    "

## 2025-06-06 ENCOUNTER — TELEPHONE (OUTPATIENT)
Age: 55
End: 2025-06-06

## 2025-06-06 NOTE — TELEPHONE ENCOUNTER
Writer attempted to contact pt regarding referral for Mikayla [facility]S to verify services needed to place her on [facility] wait list. Lvm to call writer back.

## 2025-06-10 ENCOUNTER — TELEPHONE (OUTPATIENT)
Dept: FAMILY MEDICINE CLINIC | Facility: CLINIC | Age: 55
End: 2025-06-10

## 2025-06-10 DIAGNOSIS — E03.9 HYPOTHYROIDISM, UNSPECIFIED TYPE: ICD-10-CM

## 2025-06-10 RX ORDER — LEVOTHYROXINE SODIUM 112 UG/1
112 TABLET ORAL
Qty: 100 TABLET | Refills: 1 | Status: SHIPPED | OUTPATIENT
Start: 2025-06-10

## 2025-06-11 NOTE — TELEPHONE ENCOUNTER
I spoke with patient, she has not started this yet as she wanted to try Prevagen first. She will follow up with you once she starts (if she starts)

## 2025-06-11 NOTE — TELEPHONE ENCOUNTER
[facility]: Cancer Care                                                                                          Completed chart audit to update Oncology Care Coordination enrollment status.  Reviewed POC and pt has appropriate follow up scheduled.     MIRELLA CohenN, RN  RN Care Coordinator  [facility]     If patient decides to start please inform me as I will send in additional Rx for patient to increase dose if tolerated to maintenance dosing.

## 2025-06-23 ENCOUNTER — TELEPHONE (OUTPATIENT)
Age: 55
End: 2025-06-23

## 2025-06-23 DIAGNOSIS — R35.0 URINARY FREQUENCY: Primary | ICD-10-CM

## 2025-06-23 NOTE — TELEPHONE ENCOUNTER
I would not recommend treatment for UTI virtually, as we should obtain urine testing to both confirm infection as well as identify the bacteria to ensure correct antibiotic is selected. I can place an outpatient order for urine testing if patient has a closer [facility] lab facility to her.

## 2025-06-23 NOTE — TELEPHONE ENCOUNTER
Patient returned call she would like the urine lab to be added to her chart. She stated she will find a lab close to her area.    Patient would like a call once lab is active    Thank you

## 2025-07-10 DIAGNOSIS — R14.0 ABDOMINAL BLOATING: Primary | ICD-10-CM

## 2025-07-11 NOTE — PROGRESS NOTES
Name: Afua Menjivar      : 1970      MRN: [id number]  Encounter Provider: Dania Tobar PA-C  Encounter Date: 2025   Encounter department: [facility]  :  Assessment & Plan  Encounter for gynecological examination (general) (routine) without abnormal findings  -Patient is doing well today   -Pap   -Mammo slip given   -Colonoscopy due   -Perineal hygiene reviewed. Weight bearing exercises minium of 150 mins/weekly advised. Kegel exercises recommended. SBE encouraged, A yearly mammogram is recommended for breast cancer screening starting at age 40. ASCCP guidelines reviewed. Condoms encouraged with all sexual activity to prevent STI's. Gardisil vaccines recommended up to age 45. Calcium/ Vit D dietary requirements discussed.   -Advised to call with any issues, all concerns & questions addressed.   -See provided information in your after visit summary     RTO one year for yearly exam or sooner as needed.         Pelvic floor weakness in female  - Referral to pelvic floor physical therapy ordered for increased frequency of urination and pelvic floor weakness.  -Discussed Kegel exercises patient and to avoid bladder irritants  -Discussed other options if patient fails physical therapy such as pessary and referral to urogynecology to talk about surgical options.  Orders:    Ambulatory referral to Physical Therapy; Future    Encounter for screening mammogram for malignant neoplasm of breast    Orders:    Mammo screening bilateral w 3d and cad; Future    Women's annual routine gynecological examination    Orders:    Ambulatory Referral to Obstetrics / Gynecology      __________________________________________________________________    History of Present Illness   HPI  Afua Menjivar is a 55 y.o.  presenting for annual exam.     SCREENING  Last Pap: 2024 NILM   Last Mammo: 2024 fibroglandular density, no TC score. BI RAD 1  Last Colonoscopy:  2023 recall 5 years  Last Dexa: 2025 osteopenia, recall 2 years    GYN  Postmenopausal   Sexually active: Yes - single partner - male  Concerns: denies pain, bleeding Reports dryness   Contraception: Tubal in early   STI Testing: Declined     Hx Abnormal pap: reports  We reviewed ASCCP guidelines for Pap testing today.  Gardasil: She has not completed the Gardasil series.    Denies vaginal discharge, postmenopausal bleeding itching, odor, dyspareunia, pelvic pain and vulvar/vaginal symptoms    OB         Complaints: reports frequency   Denies urgency, frequency, hematuria, leakage / change in stream, difficulty urinating.     BREAST  Complaints: denies   Denies: breast lump, breast tenderness, nipple discharge, skin color change, and skin lesion(s)    Pertinent Family Hx:   Family hx of breast cancer: no  Family hx of ovarian cancer: no  Family hx of colon cancer: no  Family hx of uterine cancer: no    Review of Systems   Constitutional: Negative.    Respiratory: Negative.     Cardiovascular: Negative.    Gastrointestinal: Negative.    Genitourinary:  Positive for frequency. Negative for dysuria, urgency and vaginal discharge.   Psychiatric/Behavioral: Negative.       Past Medical History[1]    Current Medications[2]     Social History     Socioeconomic History    Marital status: /Civil Union     Spouse name: Not on file    Number of children: Not on file    Years of education: Not on file    Highest education level: Not on file   Occupational History    Not on file   Tobacco Use    Smoking status: Former     Current packs/day: 0.00     Average packs/day: 1 pack/day for 5.0 years (5.0 ttl pk-yrs)     Types: Cigarettes     Quit date: 2010     Years since quitting: 15.5    Smokeless tobacco: Never   Vaping Use    Vaping status: Never Used   Substance and Sexual Activity    Alcohol use: Yes     Alcohol/week: 6.0 standard drinks of alcohol     Types: 1 Glasses of wine, 5 Cans of beer  per week    Drug use: Never    Sexual activity: Yes     Partners: Female     Birth control/protection: Female Sterilization   Other Topics Concern    Not on file   Social History Narrative    Not on file     Social Drivers of Health     Financial Resource Strain: Not on file   Food Insecurity: Not on file   Transportation Needs: Not on file   Physical Activity: Not on file   Stress: Not on file   Social Connections: Unknown (6/18/2024)    Received from [employer] [facility]     How often do you feel lonely or isolated from those around you? (Adult - for ages 18 years and over): Not on file   Intimate Partner Violence: Not on file   Housing Stability: Not on file       Allergies[3]    Past Surgical History[4]    Objective   /80 (BP Location: Left arm, Patient Position: Sitting, Cuff Size: Standard)   Wt 78 kg (172 lb)   BMI 25.40 kg/m²      Physical Exam  Constitutional:       General: She is not in acute distress.     Appearance: Normal appearance. She is well-developed and well-groomed. She is not ill-appearing.   Genitourinary:      Bladder, rectum and urethral meatus normal.      No lesions in the vagina.      Right Labia: No rash, tenderness, lesions or skin changes.     Left Labia: No tenderness, lesions, skin changes or rash.     No vaginal discharge, erythema, tenderness or bleeding.      Anterior and posterior vaginal prolapse present.     No vaginal atrophy present.       Right Adnexa: not tender, not full and no mass present.     Left Adnexa: not tender, not full and no mass present.     No cervical motion tenderness, discharge, friability, lesion or polyp.      Uterus is not enlarged, fixed, tender or irregular.      No uterine mass detected.     No urethral tenderness or mass present.      Bladder is not tender.    Breasts:     Breasts are symmetrical.      Right: Normal. Present. No swelling, bleeding, inverted nipple, mass, nipple discharge, skin change, tenderness or breast implant.       Left: Normal. Present. No swelling, bleeding, inverted nipple, mass, nipple discharge, skin change, tenderness or breast implant.   HENT:      Head: Normocephalic and atraumatic.   Neck:      Thyroid: No thyroid mass, thyromegaly or thyroid tenderness.   Pulmonary:      Effort: Pulmonary effort is normal.   Abdominal:      General: Abdomen is flat.   Lymphadenopathy:      Upper Body:      Right upper body: No supraclavicular or axillary adenopathy.      Left upper body: No supraclavicular or axillary adenopathy.     Neurological:      Mental Status: She is alert.     Psychiatric:         Mood and Affect: Mood normal.         Behavior: Behavior normal. Behavior is cooperative.         Thought Content: Thought content normal.         Judgment: Judgment normal.               [1]   Past Medical History:  Diagnosis Date    Disease of thyroid gland 1991    Hypothyroidism 1991    Varicella 1987   [2]   Current Outpatient Medications:     Apoaequorin (Prevagen) 10 MG CAPS, , Disp: , Rfl:     Bacillus Coagulans-Inulin (Probiotic) 1-250 BILLION-MG CAPS, , Disp: , Rfl:     levothyroxine 112 mcg tablet, Take 1 tablet (112 mcg total) by mouth daily in the early morning, Disp: 100 tablet, Rfl: 1    triamcinolone (KENALOG) 0.1 % cream, Apply topically 2 (two) times a day Use daily for about 2 weeks and assess response, Disp: 15 g, Rfl: 0    atoMOXetine (STRATTERA) 40 mg capsule, Take 1 capsule (40 mg total) by mouth daily, Disp: 30 capsule, Rfl: 1  [3] No Known Allergies  [4]   Past Surgical History:  Procedure Laterality Date    APPENDECTOMY  1969    KNEE SURGERY  2017

## 2025-07-14 ENCOUNTER — CONSULT (OUTPATIENT)
Dept: OBGYN CLINIC | Facility: CLINIC | Age: 55
End: 2025-07-14
Payer: COMMERCIAL

## 2025-07-14 VITALS — SYSTOLIC BLOOD PRESSURE: 128 MMHG | BODY MASS INDEX: 25.4 KG/M2 | WEIGHT: 172 LBS | DIASTOLIC BLOOD PRESSURE: 80 MMHG

## 2025-07-14 DIAGNOSIS — Z01.419 WOMEN'S ANNUAL ROUTINE GYNECOLOGICAL EXAMINATION: ICD-10-CM

## 2025-07-14 DIAGNOSIS — Z01.419 ENCOUNTER FOR GYNECOLOGICAL EXAMINATION (GENERAL) (ROUTINE) WITHOUT ABNORMAL FINDINGS: Primary | ICD-10-CM

## 2025-07-14 DIAGNOSIS — N81.89 PELVIC FLOOR WEAKNESS IN FEMALE: ICD-10-CM

## 2025-07-14 DIAGNOSIS — Z12.31 ENCOUNTER FOR SCREENING MAMMOGRAM FOR MALIGNANT NEOPLASM OF BREAST: ICD-10-CM

## 2025-07-14 PROCEDURE — S0610 ANNUAL GYNECOLOGICAL EXAMINA: HCPCS

## 2025-07-14 RX ORDER — BACILLUS COAGULANS/INULIN 1B-250 MG
CAPSULE ORAL
COMMUNITY